# Patient Record
Sex: MALE | Race: WHITE
[De-identification: names, ages, dates, MRNs, and addresses within clinical notes are randomized per-mention and may not be internally consistent; named-entity substitution may affect disease eponyms.]

---

## 2018-07-06 ENCOUNTER — HOSPITAL ENCOUNTER (EMERGENCY)
Dept: HOSPITAL 94 - ER | Age: 63
Discharge: HOME | End: 2018-07-06
Payer: COMMERCIAL

## 2018-07-06 VITALS — HEIGHT: 71 IN | WEIGHT: 240.3 LBS | BODY MASS INDEX: 33.64 KG/M2

## 2018-07-06 VITALS — SYSTOLIC BLOOD PRESSURE: 112 MMHG | DIASTOLIC BLOOD PRESSURE: 73 MMHG

## 2018-07-06 DIAGNOSIS — L03.115: Primary | ICD-10-CM

## 2018-07-06 DIAGNOSIS — Z79.899: ICD-10-CM

## 2018-07-06 DIAGNOSIS — Z88.2: ICD-10-CM

## 2018-07-06 LAB
ALBUMIN SERPL BCP-MCNC: 2.9 G/DL (ref 3.4–5)
ALBUMIN/GLOB SERPL: 0.7 {RATIO} (ref 1.1–1.5)
ALP SERPL-CCNC: 120 IU/L (ref 46–116)
ALT SERPL W P-5'-P-CCNC: 22 U/L (ref 12–78)
ANION GAP SERPL CALCULATED.3IONS-SCNC: 9 MMOL/L (ref 8–16)
APTT PPP: 29 SECONDS (ref 22–32)
AST SERPL W P-5'-P-CCNC: 14 U/L (ref 10–37)
BACTERIA URNS QL MICRO: (no result) /HPF
BASOPHILS # BLD AUTO: 0 X10'3 (ref 0–0.2)
BASOPHILS NFR BLD AUTO: 0.2 % (ref 0–1)
BILIRUB SERPL-MCNC: 0.5 MG/DL (ref 0.1–1)
BUN SERPL-MCNC: 11 MG/DL (ref 7–18)
BUN/CREAT SERPL: 10.7 (ref 5.4–32)
CALCIUM SERPL-MCNC: 8.6 MG/DL (ref 8.5–10.1)
CHLORIDE SERPL-SCNC: 97 MMOL/L (ref 99–107)
CLARITY UR: CLEAR
CO2 SERPL-SCNC: 24.2 MMOL/L (ref 24–32)
COLOR UR: YELLOW
CREAT SERPL-MCNC: 1.03 MG/DL (ref 0.6–1.1)
DEPRECATED SQUAMOUS URNS QL MICRO: (no result) /LPF
EOSINOPHIL # BLD AUTO: 0.1 X10'3 (ref 0–0.9)
EOSINOPHIL NFR BLD AUTO: 1.2 % (ref 0–6)
ERYTHROCYTE [DISTWIDTH] IN BLOOD BY AUTOMATED COUNT: 14.1 % (ref 11.5–14.5)
GFR SERPL CREATININE-BSD FRML MDRD: 73 ML/MIN
GLUCOSE SERPL-MCNC: 326 MG/DL (ref 70–104)
GLUCOSE UR STRIP-MCNC: >=1000 MG/DL
HCT VFR BLD AUTO: 40.4 % (ref 42–52)
HGB BLD-MCNC: 13.9 G/DL (ref 14–17.9)
HGB UR QL STRIP: NEGATIVE
INR PPP: 0.9 INR
KETONES UR STRIP-MCNC: 15 MG/DL
LEUKOCYTE ESTERASE UR QL STRIP: NEGATIVE
LYMPHOCYTES # BLD AUTO: 0.6 X10'3 (ref 1.1–4.8)
LYMPHOCYTES NFR BLD AUTO: 10.9 % (ref 21–51)
MAGNESIUM SERPL-MCNC: 1.6 MG/DL (ref 1.5–2.4)
MCH RBC QN AUTO: 31.3 PG (ref 27–31)
MCHC RBC AUTO-ENTMCNC: 34.4 % (ref 33–36.5)
MCV RBC AUTO: 91.2 FL (ref 78–98)
MONOCYTES # BLD AUTO: 0.6 X10'3 (ref 0–0.9)
MONOCYTES NFR BLD AUTO: 10.9 % (ref 2–12)
NEUTROPHILS # BLD AUTO: 4.5 X10'3 (ref 1.8–7.7)
NEUTROPHILS NFR BLD AUTO: 76.8 % (ref 42–75)
NITRITE UR QL STRIP: NEGATIVE
PH UR STRIP: 6 [PH] (ref 4.8–8)
PLATELET # BLD AUTO: 155 X10'3 (ref 140–440)
PMV BLD AUTO: 9.1 FL (ref 7.4–10.4)
POTASSIUM SERPL-SCNC: 4.1 MMOL/L (ref 3.5–5.1)
PROT SERPL-MCNC: 7.3 G/DL (ref 6.4–8.2)
PROT UR QL STRIP: NEGATIVE MG/DL
PROTHROMBIN TIME: 9.7 SECONDS (ref 9–12)
RBC # BLD AUTO: 4.43 X10'6 (ref 4.7–6.1)
RBC #/AREA URNS HPF: (no result) /HPF (ref 0–2)
SODIUM SERPL-SCNC: 130 MMOL/L (ref 135–145)
SP GR UR STRIP: 1.02 (ref 1–1.03)
URN COLLECT METHOD CLASS: (no result)
UROBILINOGEN UR STRIP-MCNC: 1 E.U/DL (ref 0.2–1)
WBC # BLD AUTO: 5.9 X10'3 (ref 4.5–11)
WBC #/AREA URNS HPF: (no result) /HPF (ref 0–4)

## 2018-07-06 PROCEDURE — 96365 THER/PROPH/DIAG IV INF INIT: CPT

## 2018-07-06 PROCEDURE — 85610 PROTHROMBIN TIME: CPT

## 2018-07-06 PROCEDURE — 84145 PROCALCITONIN (PCT): CPT

## 2018-07-06 PROCEDURE — 93971 EXTREMITY STUDY: CPT

## 2018-07-06 PROCEDURE — 85025 COMPLETE CBC W/AUTO DIFF WBC: CPT

## 2018-07-06 PROCEDURE — 36415 COLL VENOUS BLD VENIPUNCTURE: CPT

## 2018-07-06 PROCEDURE — 83735 ASSAY OF MAGNESIUM: CPT

## 2018-07-06 PROCEDURE — 87040 BLOOD CULTURE FOR BACTERIA: CPT

## 2018-07-06 PROCEDURE — 99285 EMERGENCY DEPT VISIT HI MDM: CPT

## 2018-07-06 PROCEDURE — 80053 COMPREHEN METABOLIC PANEL: CPT

## 2018-07-06 PROCEDURE — 83605 ASSAY OF LACTIC ACID: CPT

## 2018-07-06 PROCEDURE — 81001 URINALYSIS AUTO W/SCOPE: CPT

## 2018-07-06 PROCEDURE — 85730 THROMBOPLASTIN TIME PARTIAL: CPT

## 2018-07-09 ENCOUNTER — APPOINTMENT (OUTPATIENT)
Dept: RADIOLOGY | Facility: MEDICAL CENTER | Age: 63
DRG: 464 | End: 2018-07-09
Attending: ORTHOPAEDIC SURGERY
Payer: COMMERCIAL

## 2018-07-09 ENCOUNTER — HOSPITAL ENCOUNTER (INPATIENT)
Facility: MEDICAL CENTER | Age: 63
LOS: 8 days | DRG: 464 | End: 2018-07-17
Admitting: HOSPITALIST
Payer: COMMERCIAL

## 2018-07-09 ENCOUNTER — HOSPITAL ENCOUNTER (OUTPATIENT)
Facility: MEDICAL CENTER | Age: 63
DRG: 464 | End: 2018-07-09
Payer: COMMERCIAL

## 2018-07-09 DIAGNOSIS — M70.41 PREPATELLAR BURSITIS OF RIGHT KNEE: ICD-10-CM

## 2018-07-09 PROBLEM — M00.9 INFECTION OF RIGHT KNEE (HCC): Status: ACTIVE | Noted: 2018-07-09

## 2018-07-09 PROBLEM — R73.9 HYPERGLYCEMIA: Status: ACTIVE | Noted: 2018-07-09

## 2018-07-09 LAB
CRP SERPL HS-MCNC: 11.93 MG/DL (ref 0–0.75)
ERYTHROCYTE [SEDIMENTATION RATE] IN BLOOD BY WESTERGREN METHOD: 97 MM/HOUR (ref 0–20)
EST. AVERAGE GLUCOSE BLD GHB EST-MCNC: 295 MG/DL
GLUCOSE BLD-MCNC: 176 MG/DL (ref 65–99)
GLUCOSE BLD-MCNC: 196 MG/DL (ref 65–99)
GRAM STN SPEC: NORMAL
HBA1C MFR BLD: 11.9 % (ref 0–5.6)
INR PPP: 1.06 (ref 0.87–1.13)
PROTHROMBIN TIME: 13.5 SEC (ref 12–14.6)
SIGNIFICANT IND 70042: NORMAL
SITE SITE: NORMAL
SOURCE SOURCE: NORMAL

## 2018-07-09 PROCEDURE — 87206 SMEAR FLUORESCENT/ACID STAI: CPT

## 2018-07-09 PROCEDURE — 73560 X-RAY EXAM OF KNEE 1 OR 2: CPT | Mod: RT

## 2018-07-09 PROCEDURE — 85652 RBC SED RATE AUTOMATED: CPT

## 2018-07-09 PROCEDURE — 501488 HCHG SUCTION CANN, WOUNDVAC TRAC: Performed by: ORTHOPAEDIC SURGERY

## 2018-07-09 PROCEDURE — 87186 SC STD MICRODIL/AGAR DIL: CPT

## 2018-07-09 PROCEDURE — 86140 C-REACTIVE PROTEIN: CPT

## 2018-07-09 PROCEDURE — 700101 HCHG RX REV CODE 250

## 2018-07-09 PROCEDURE — 160002 HCHG RECOVERY MINUTES (STAT): Performed by: ORTHOPAEDIC SURGERY

## 2018-07-09 PROCEDURE — 160009 HCHG ANES TIME/MIN: Performed by: ORTHOPAEDIC SURGERY

## 2018-07-09 PROCEDURE — 99223 1ST HOSP IP/OBS HIGH 75: CPT | Performed by: HOSPITALIST

## 2018-07-09 PROCEDURE — 36415 COLL VENOUS BLD VENIPUNCTURE: CPT

## 2018-07-09 PROCEDURE — 500881 HCHG PACK, EXTREMITY: Performed by: ORTHOPAEDIC SURGERY

## 2018-07-09 PROCEDURE — 160035 HCHG PACU - 1ST 60 MINS PHASE I: Performed by: ORTHOPAEDIC SURGERY

## 2018-07-09 PROCEDURE — 770006 HCHG ROOM/CARE - MED/SURG/GYN SEMI*

## 2018-07-09 PROCEDURE — 700111 HCHG RX REV CODE 636 W/ 250 OVERRIDE (IP)

## 2018-07-09 PROCEDURE — 160048 HCHG OR STATISTICAL LEVEL 1-5: Performed by: ORTHOPAEDIC SURGERY

## 2018-07-09 PROCEDURE — 700105 HCHG RX REV CODE 258: Performed by: HOSPITALIST

## 2018-07-09 PROCEDURE — 160041 HCHG SURGERY MINUTES - EA ADDL 1 MIN LEVEL 4: Performed by: ORTHOPAEDIC SURGERY

## 2018-07-09 PROCEDURE — 0MJY3ZZ INSPECTION OF LOWER BURSA AND LIGAMENT, PERCUTANEOUS APPROACH: ICD-10-PCS | Performed by: ORTHOPAEDIC SURGERY

## 2018-07-09 PROCEDURE — 160029 HCHG SURGERY MINUTES - 1ST 30 MINS LEVEL 4: Performed by: ORTHOPAEDIC SURGERY

## 2018-07-09 PROCEDURE — 87116 MYCOBACTERIA CULTURE: CPT

## 2018-07-09 PROCEDURE — 700102 HCHG RX REV CODE 250 W/ 637 OVERRIDE(OP): Performed by: HOSPITALIST

## 2018-07-09 PROCEDURE — 87075 CULTR BACTERIA EXCEPT BLOOD: CPT

## 2018-07-09 PROCEDURE — 87205 SMEAR GRAM STAIN: CPT

## 2018-07-09 PROCEDURE — 83036 HEMOGLOBIN GLYCOSYLATED A1C: CPT

## 2018-07-09 PROCEDURE — 87015 SPECIMEN INFECT AGNT CONCNTJ: CPT | Mod: 91

## 2018-07-09 PROCEDURE — 87070 CULTURE OTHR SPECIMN AEROBIC: CPT

## 2018-07-09 PROCEDURE — 87077 CULTURE AEROBIC IDENTIFY: CPT | Mod: 91

## 2018-07-09 PROCEDURE — 501838 HCHG SUTURE GENERAL: Performed by: ORTHOPAEDIC SURGERY

## 2018-07-09 PROCEDURE — 700111 HCHG RX REV CODE 636 W/ 250 OVERRIDE (IP): Performed by: HOSPITALIST

## 2018-07-09 PROCEDURE — A6550 NEG PRES WOUND THER DRSG SET: HCPCS | Performed by: ORTHOPAEDIC SURGERY

## 2018-07-09 PROCEDURE — 0HBKXZZ EXCISION OF RIGHT LOWER LEG SKIN, EXTERNAL APPROACH: ICD-10-PCS | Performed by: ORTHOPAEDIC SURGERY

## 2018-07-09 PROCEDURE — 501445 HCHG STAPLER, SKIN DISP: Performed by: ORTHOPAEDIC SURGERY

## 2018-07-09 PROCEDURE — 87102 FUNGUS ISOLATION CULTURE: CPT

## 2018-07-09 PROCEDURE — A6454 SELF-ADHER BAND W>=3" <5"/YD: HCPCS | Performed by: ORTHOPAEDIC SURGERY

## 2018-07-09 PROCEDURE — 85610 PROTHROMBIN TIME: CPT

## 2018-07-09 PROCEDURE — 82962 GLUCOSE BLOOD TEST: CPT | Mod: 91

## 2018-07-09 RX ORDER — OXYCODONE HYDROCHLORIDE 5 MG/1
5-10 TABLET ORAL
Status: DISCONTINUED | OUTPATIENT
Start: 2018-07-09 | End: 2018-07-17 | Stop reason: HOSPADM

## 2018-07-09 RX ORDER — PROMETHAZINE HYDROCHLORIDE 25 MG/1
12.5-25 TABLET ORAL EVERY 4 HOURS PRN
Status: DISCONTINUED | OUTPATIENT
Start: 2018-07-09 | End: 2018-07-17 | Stop reason: HOSPADM

## 2018-07-09 RX ORDER — BISACODYL 10 MG
10 SUPPOSITORY, RECTAL RECTAL
Status: DISCONTINUED | OUTPATIENT
Start: 2018-07-09 | End: 2018-07-17 | Stop reason: HOSPADM

## 2018-07-09 RX ORDER — MAGNESIUM HYDROXIDE 1200 MG/15ML
LIQUID ORAL
Status: COMPLETED | OUTPATIENT
Start: 2018-07-09 | End: 2018-07-09

## 2018-07-09 RX ORDER — ONDANSETRON 2 MG/ML
4 INJECTION INTRAMUSCULAR; INTRAVENOUS EVERY 4 HOURS PRN
Status: DISCONTINUED | OUTPATIENT
Start: 2018-07-09 | End: 2018-07-17 | Stop reason: HOSPADM

## 2018-07-09 RX ORDER — AMOXICILLIN 250 MG
2 CAPSULE ORAL 2 TIMES DAILY
Status: DISCONTINUED | OUTPATIENT
Start: 2018-07-09 | End: 2018-07-17 | Stop reason: HOSPADM

## 2018-07-09 RX ORDER — ACETAMINOPHEN 325 MG/1
650 TABLET ORAL EVERY 6 HOURS PRN
Status: DISCONTINUED | OUTPATIENT
Start: 2018-07-09 | End: 2018-07-17 | Stop reason: HOSPADM

## 2018-07-09 RX ORDER — DEXTROSE MONOHYDRATE 25 G/50ML
25 INJECTION, SOLUTION INTRAVENOUS
Status: DISCONTINUED | OUTPATIENT
Start: 2018-07-09 | End: 2018-07-17 | Stop reason: HOSPADM

## 2018-07-09 RX ORDER — MORPHINE SULFATE 4 MG/ML
1-4 INJECTION, SOLUTION INTRAMUSCULAR; INTRAVENOUS
Status: DISCONTINUED | OUTPATIENT
Start: 2018-07-09 | End: 2018-07-17 | Stop reason: HOSPADM

## 2018-07-09 RX ORDER — POLYETHYLENE GLYCOL 3350 17 G/17G
1 POWDER, FOR SOLUTION ORAL
Status: DISCONTINUED | OUTPATIENT
Start: 2018-07-09 | End: 2018-07-17 | Stop reason: HOSPADM

## 2018-07-09 RX ORDER — ONDANSETRON 4 MG/1
4 TABLET, ORALLY DISINTEGRATING ORAL EVERY 4 HOURS PRN
Status: DISCONTINUED | OUTPATIENT
Start: 2018-07-09 | End: 2018-07-17 | Stop reason: HOSPADM

## 2018-07-09 RX ORDER — PROMETHAZINE HYDROCHLORIDE 12.5 MG/1
12.5-25 SUPPOSITORY RECTAL EVERY 4 HOURS PRN
Status: DISCONTINUED | OUTPATIENT
Start: 2018-07-09 | End: 2018-07-17 | Stop reason: HOSPADM

## 2018-07-09 RX ADMIN — FENTANYL CITRATE 50 MCG: 50 INJECTION, SOLUTION INTRAMUSCULAR; INTRAVENOUS at 20:38

## 2018-07-09 RX ADMIN — INSULIN HUMAN 3 UNITS: 100 INJECTION, SOLUTION PARENTERAL at 17:54

## 2018-07-09 RX ADMIN — AMPICILLIN SODIUM AND SULBACTAM SODIUM 3 G: 2; 1 INJECTION, POWDER, FOR SOLUTION INTRAMUSCULAR; INTRAVENOUS at 23:41

## 2018-07-09 RX ADMIN — INSULIN HUMAN 3 UNITS: 100 INJECTION, SOLUTION PARENTERAL at 22:25

## 2018-07-09 RX ADMIN — FENTANYL CITRATE 50 MCG: 50 INJECTION, SOLUTION INTRAMUSCULAR; INTRAVENOUS at 20:43

## 2018-07-09 RX ADMIN — AMPICILLIN SODIUM AND SULBACTAM SODIUM 3 G: 2; 1 INJECTION, POWDER, FOR SOLUTION INTRAMUSCULAR; INTRAVENOUS at 16:55

## 2018-07-09 ASSESSMENT — COGNITIVE AND FUNCTIONAL STATUS - GENERAL
MOBILITY SCORE: 19
MOVING FROM LYING ON BACK TO SITTING ON SIDE OF FLAT BED: A LITTLE
CLIMB 3 TO 5 STEPS WITH RAILING: A LITTLE
SUGGESTED CMS G CODE MODIFIER DAILY ACTIVITY: CI
TURNING FROM BACK TO SIDE WHILE IN FLAT BAD: A LITTLE
STANDING UP FROM CHAIR USING ARMS: A LITTLE
SUGGESTED CMS G CODE MODIFIER MOBILITY: CK
WALKING IN HOSPITAL ROOM: A LITTLE
DAILY ACTIVITIY SCORE: 23
DRESSING REGULAR LOWER BODY CLOTHING: A LITTLE

## 2018-07-09 ASSESSMENT — PAIN SCALES - GENERAL
PAINLEVEL_OUTOF10: 4
PAINLEVEL_OUTOF10: 0
PAINLEVEL_OUTOF10: 4
PAINLEVEL_OUTOF10: 0
PAINLEVEL_OUTOF10: 3

## 2018-07-09 ASSESSMENT — PATIENT HEALTH QUESTIONNAIRE - PHQ9
1. LITTLE INTEREST OR PLEASURE IN DOING THINGS: NOT AT ALL
2. FEELING DOWN, DEPRESSED, IRRITABLE, OR HOPELESS: NOT AT ALL
SUM OF ALL RESPONSES TO PHQ9 QUESTIONS 1 AND 2: 0

## 2018-07-09 ASSESSMENT — ENCOUNTER SYMPTOMS
VOMITING: 0
FEVER: 0
SHORTNESS OF BREATH: 0
CHILLS: 0

## 2018-07-09 ASSESSMENT — LIFESTYLE VARIABLES
ALCOHOL_USE: NO
EVER_SMOKED: NEVER

## 2018-07-09 NOTE — CONSULTS
Date of Service:  7/9/2018    PCP: Pcp Pt States None    CC:  Right TKA infection    HPI: This is a 62 y.o. male who is s/p right total knee arthroplasty with Dr Marin in 2008.  He has a history of L TKA revision for infection with Dr Ko in 2013.  He presents with an approximate 10 day history of worsening right knee and leg erythema, swelling, and draining pus from a wound on the anterior aspect of the knee.  He was treated with a course of oral clindamycin.  He presented to [facility] today with worsening symptoms.  He was transferred to [facility] for definitive management.  Orthopaedics was asked to consult by Dr Robbins.      ROS: As above. The remainder of a complete review of systems is negative in all systems except as noted.    PMHx:  Active Ambulatory Problems     Diagnosis Date Noted   • No Active Ambulatory Problems     Resolved Ambulatory Problems     Diagnosis Date Noted   • No Resolved Ambulatory Problems     No Additional Past Medical History       SHx:  Social History     Social History   • Marital status:      Spouse name: N/A   • Number of children: N/A   • Years of education: N/A     Occupational History   • Not on file.     Social History Main Topics   • Smoking status: Not on file   • Smokeless tobacco: Not on file   • Alcohol use Not on file   • Drug use: Unknown   • Sexual activity: Not on file     Other Topics Concern   • Not on file     Social History Narrative   • No narrative on file       FHx:  family history is not on file.    Allergies:  Allergies not on file    Medications:  No current facility-administered medications on file prior to encounter.      No current outpatient prescriptions on file prior to encounter.       Objective Exam:  Vitals:    07/09/18 1400   BP: 135/81   Pulse: 84   Resp: 16   Temp: 36.9 °C (98.5 °F)   SpO2: 98%   Weight: 114 kg (251 lb 5.2 oz)       General: alert & oriented  HEENT: head atraumatic, neck supple, mucus membranes moist  Chest:  nonlabored breathing, no audible wheezing  CV: regular rate, rhythm  Abd: soft, nontender  Ext: right lower extremity - erythema and swelling over the anterior aspect of knee tracking down the anterior aspect of the leg.  Necrotic eschar present over the anterior knee draining pus.  Knee ROM is limited 0-15 secondary to pain and swelling.  He is able to flex and extend his ankle, toes.  Foot is warm, well-perfused.  Neuro: sensation preserved over right foot  Skin: as above    Laboratory--reviewed personally and are as follows:  Lab Results   Component Value Date/Time    WBC 4.9 03/05/2007 10:05 AM    RBC 5.11 03/05/2007 10:05 AM    HEMOGLOBIN 15.3 03/05/2007 10:05 AM    HEMATOCRIT 43.7 03/05/2007 10:05 AM    MCV 85.4 03/05/2007 10:05 AM    MCH 29.9 03/05/2007 10:05 AM    MCHC 35.0 03/05/2007 10:05 AM    MPV 7.5 03/05/2007 10:05 AM    NEUTSPOLYS 58.9 03/05/2007 10:05 AM    LYMPHOCYTES 28.6 03/05/2007 10:05 AM    MONOCYTES 5.5 03/05/2007 10:05 AM    EOSINOPHILS 6.7 (H) 03/05/2007 10:05 AM    BASOPHILS 0.4 03/05/2007 10:05 AM      No results found for: SODIUM, POTASSIUM, CHLORIDE, CO2, GLUCOSE, BUN, CREATININE, BUNCREATRAT, GLOMRATE   No results found for: PROTHROMBTM, INR     Radiology  2 views R knee pending    Assessment:  - Right knee infected bursitis with necrotic eschar in setting of total knee arthroplasty    Plan:  - Please keep NPO.  Will plan for OR today for I&D of the right knee & possible wound vac placement.  Will aspirate knee through non-cellulitic area at that time and send for cell count with differential and culture data.  There is a possibility of full poly exchange depending on the status of the fascial layer.  Please hold antibiotics at this time.  Ordered for knee x-rays, CRP, ESR.  Discussed with patient who agrees to proceed  - WBAT RLE

## 2018-07-09 NOTE — CARE PLAN
Problem: Safety  Goal: Will remain free from injury  Outcome: PROGRESSING AS EXPECTED  Pt educated on fall risk. Call light is within reach. Bed alarm in use.     Problem: Infection  Goal: Will remain free from infection  Outcome: PROGRESSING AS EXPECTED  RN educated pt on not touching open wound. Pt verbalized understanding.

## 2018-07-09 NOTE — ASSESSMENT & PLAN NOTE
s/p I/D 7/9 and 7/12  Ortho on  Culture pending  ID on, orderPICC line.  Patient initially declined however after prolonged discussion patient agreed currently.  Close monitor  Cont iv abx cefazolin total of 4 weeks antibiotics  Patient has infected bursitis  PICC line in place  Patient need to have IV antibiotics, likely be able to use ertapenem however patient wants to go home at this moment we will try to convince patient to accept IV antibiotics at home.  Patient also need wound care as outpatient.

## 2018-07-09 NOTE — PROGRESS NOTES
Direct admit from [facility]. Accepted by Dr. Robbins for rt knee cellulitis.  ADT signed & held, needs to be released upon pt arrival.  No written orders received.  Pt coming by ground.

## 2018-07-09 NOTE — PROGRESS NOTES
Pharmacy Kinetics 62 y.o. male on vancomycin day # 1    2018    Currently on Vancomycin 2900 mg IV x1 followed by 1700 mg IV q12h (patient did get 1000 mg IV at about 0900 this AM)    Indication for Treatment: R septic knee    Pertinent history per medical record: Admitted on 2018 for R knee infection. Patient presented to [facility] for right knee infection and started on keflex/clinda without improvement. He has gotten progressively worse with purulent material draining from knee. Ortho has been consulted and is taking patient for aspiration, I&D, ? Wound vac placement tonight. Had knee arthroplasty in ..    Other antibiotics: Unasyn 3 g IV q6h    Allergies: Patient has no allergy information on record.     List concerns for renal function: BMI, age    Pertinent cultures to date:   Yet to be drawn    Labs from [facility] this morning with BUN 16 and SCr 0.8    Blood pressure 135/81, pulse 84, temperature 36.9 °C (98.5 °F), resp. rate 16, weight 114 kg (251 lb 5.2 oz), SpO2 98 %. Temp (24hrs), Av.9 °C (98.5 °F), Min:36.9 °C (98.5 °F), Max:36.9 °C (98.5 °F)      A/P   1. Vancomycin dose change: new start  2. Next vancomycin level: 12- days (not ordered)  3. Goal trough: ~16 mcg/mL (? Septic joint, but no notes yet)  4. Comments: D/w MD, ortho to take to OR tonight. Ortho would like all abx held until after procedure. Unasyn was given already and vanco this morning. I have removed the vanco dose from the MAR for now and will restart post-op. Cx to be sent intra-op.    Daryl Michel, PharmD, BCPS

## 2018-07-10 PROBLEM — E11.9 TYPE 2 DIABETES MELLITUS, WITHOUT LONG-TERM CURRENT USE OF INSULIN (HCC): Status: ACTIVE | Noted: 2018-07-10

## 2018-07-10 PROBLEM — E87.1 HYPONATREMIA: Status: ACTIVE | Noted: 2018-07-10

## 2018-07-10 LAB
ALBUMIN SERPL BCP-MCNC: 3.1 G/DL (ref 3.2–4.9)
ALBUMIN/GLOB SERPL: 0.8 G/DL
ALP SERPL-CCNC: 63 U/L (ref 30–99)
ALT SERPL-CCNC: 16 U/L (ref 2–50)
ANION GAP SERPL CALC-SCNC: 9 MMOL/L (ref 0–11.9)
AST SERPL-CCNC: 12 U/L (ref 12–45)
BASOPHILS # BLD AUTO: 0.2 % (ref 0–1.8)
BASOPHILS # BLD: 0.01 K/UL (ref 0–0.12)
BILIRUB SERPL-MCNC: 0.3 MG/DL (ref 0.1–1.5)
BUN SERPL-MCNC: 20 MG/DL (ref 8–22)
CALCIUM SERPL-MCNC: 8.6 MG/DL (ref 8.5–10.5)
CHLORIDE SERPL-SCNC: 101 MMOL/L (ref 96–112)
CO2 SERPL-SCNC: 22 MMOL/L (ref 20–33)
CREAT SERPL-MCNC: 0.85 MG/DL (ref 0.5–1.4)
EOSINOPHIL # BLD AUTO: 0.01 K/UL (ref 0–0.51)
EOSINOPHIL NFR BLD: 0.2 % (ref 0–6.9)
ERYTHROCYTE [DISTWIDTH] IN BLOOD BY AUTOMATED COUNT: 44.4 FL (ref 35.9–50)
GLOBULIN SER CALC-MCNC: 3.7 G/DL (ref 1.9–3.5)
GLUCOSE BLD-MCNC: 206 MG/DL (ref 65–99)
GLUCOSE BLD-MCNC: 215 MG/DL (ref 65–99)
GLUCOSE BLD-MCNC: 240 MG/DL (ref 65–99)
GLUCOSE BLD-MCNC: 245 MG/DL (ref 65–99)
GLUCOSE SERPL-MCNC: 249 MG/DL (ref 65–99)
GRAM STN SPEC: NORMAL
HCT VFR BLD AUTO: 41.9 % (ref 42–52)
HGB BLD-MCNC: 13.6 G/DL (ref 14–18)
IMM GRANULOCYTES # BLD AUTO: 0.07 K/UL (ref 0–0.11)
IMM GRANULOCYTES NFR BLD AUTO: 1.3 % (ref 0–0.9)
LYMPHOCYTES # BLD AUTO: 0.63 K/UL (ref 1–4.8)
LYMPHOCYTES NFR BLD: 11.7 % (ref 22–41)
MCH RBC QN AUTO: 29.9 PG (ref 27–33)
MCHC RBC AUTO-ENTMCNC: 32.5 G/DL (ref 33.7–35.3)
MCV RBC AUTO: 92.1 FL (ref 81.4–97.8)
MONOCYTES # BLD AUTO: 0.12 K/UL (ref 0–0.85)
MONOCYTES NFR BLD AUTO: 2.2 % (ref 0–13.4)
NEUTROPHILS # BLD AUTO: 4.55 K/UL (ref 1.82–7.42)
NEUTROPHILS NFR BLD: 84.4 % (ref 44–72)
NRBC # BLD AUTO: 0 K/UL
NRBC BLD-RTO: 0 /100 WBC
PLATELET # BLD AUTO: 220 K/UL (ref 164–446)
PMV BLD AUTO: 10.3 FL (ref 9–12.9)
POTASSIUM SERPL-SCNC: 4.7 MMOL/L (ref 3.6–5.5)
PROT SERPL-MCNC: 6.8 G/DL (ref 6–8.2)
RBC # BLD AUTO: 4.55 M/UL (ref 4.7–6.1)
RHODAMINE-AURAMINE STN SPEC: NORMAL
SIGNIFICANT IND 70042: NORMAL
SIGNIFICANT IND 70042: NORMAL
SITE SITE: NORMAL
SITE SITE: NORMAL
SODIUM SERPL-SCNC: 132 MMOL/L (ref 135–145)
SOURCE SOURCE: NORMAL
SOURCE SOURCE: NORMAL
VANCOMYCIN TROUGH SERPL-MCNC: 43.6 UG/ML (ref 10–20)
WBC # BLD AUTO: 5.4 K/UL (ref 4.8–10.8)

## 2018-07-10 PROCEDURE — 93005 ELECTROCARDIOGRAM TRACING: CPT | Performed by: INTERNAL MEDICINE

## 2018-07-10 PROCEDURE — 80202 ASSAY OF VANCOMYCIN: CPT

## 2018-07-10 PROCEDURE — 85025 COMPLETE CBC W/AUTO DIFF WBC: CPT

## 2018-07-10 PROCEDURE — 99233 SBSQ HOSP IP/OBS HIGH 50: CPT | Performed by: INTERNAL MEDICINE

## 2018-07-10 PROCEDURE — 82962 GLUCOSE BLOOD TEST: CPT | Mod: 91

## 2018-07-10 PROCEDURE — 99223 1ST HOSP IP/OBS HIGH 75: CPT | Performed by: INTERNAL MEDICINE

## 2018-07-10 PROCEDURE — 700102 HCHG RX REV CODE 250 W/ 637 OVERRIDE(OP): Performed by: INTERNAL MEDICINE

## 2018-07-10 PROCEDURE — 700111 HCHG RX REV CODE 636 W/ 250 OVERRIDE (IP): Performed by: INTERNAL MEDICINE

## 2018-07-10 PROCEDURE — 93010 ELECTROCARDIOGRAM REPORT: CPT | Performed by: INTERNAL MEDICINE

## 2018-07-10 PROCEDURE — 770006 HCHG ROOM/CARE - MED/SURG/GYN SEMI*

## 2018-07-10 PROCEDURE — 700105 HCHG RX REV CODE 258: Performed by: HOSPITALIST

## 2018-07-10 PROCEDURE — 80053 COMPREHEN METABOLIC PANEL: CPT

## 2018-07-10 PROCEDURE — 700111 HCHG RX REV CODE 636 W/ 250 OVERRIDE (IP): Performed by: HOSPITALIST

## 2018-07-10 PROCEDURE — 36415 COLL VENOUS BLD VENIPUNCTURE: CPT

## 2018-07-10 RX ORDER — INSULIN GLARGINE 100 [IU]/ML
10 INJECTION, SOLUTION SUBCUTANEOUS EVERY EVENING
Status: DISCONTINUED | OUTPATIENT
Start: 2018-07-10 | End: 2018-07-11

## 2018-07-10 RX ADMIN — INSULIN HUMAN 4 UNITS: 100 INJECTION, SOLUTION PARENTERAL at 17:34

## 2018-07-10 RX ADMIN — INSULIN HUMAN 4 UNITS: 100 INJECTION, SOLUTION PARENTERAL at 21:34

## 2018-07-10 RX ADMIN — INSULIN GLARGINE 10 UNITS: 100 INJECTION, SOLUTION SUBCUTANEOUS at 17:32

## 2018-07-10 RX ADMIN — INSULIN HUMAN 4 UNITS: 100 INJECTION, SOLUTION PARENTERAL at 09:14

## 2018-07-10 RX ADMIN — ENOXAPARIN SODIUM 40 MG: 100 INJECTION SUBCUTANEOUS at 14:22

## 2018-07-10 RX ADMIN — INSULIN HUMAN 4 UNITS: 100 INJECTION, SOLUTION PARENTERAL at 12:10

## 2018-07-10 RX ADMIN — AMPICILLIN SODIUM AND SULBACTAM SODIUM 3 G: 2; 1 INJECTION, POWDER, FOR SOLUTION INTRAMUSCULAR; INTRAVENOUS at 05:43

## 2018-07-10 RX ADMIN — VANCOMYCIN HYDROCHLORIDE 1700 MG: 100 INJECTION, POWDER, LYOPHILIZED, FOR SOLUTION INTRAVENOUS at 13:03

## 2018-07-10 RX ADMIN — VANCOMYCIN HYDROCHLORIDE 2900 MG: 100 INJECTION, POWDER, LYOPHILIZED, FOR SOLUTION INTRAVENOUS at 00:14

## 2018-07-10 RX ADMIN — AMPICILLIN SODIUM AND SULBACTAM SODIUM 3 G: 2; 1 INJECTION, POWDER, FOR SOLUTION INTRAMUSCULAR; INTRAVENOUS at 17:29

## 2018-07-10 RX ADMIN — AMPICILLIN SODIUM AND SULBACTAM SODIUM 3 G: 2; 1 INJECTION, POWDER, FOR SOLUTION INTRAMUSCULAR; INTRAVENOUS at 12:07

## 2018-07-10 ASSESSMENT — ENCOUNTER SYMPTOMS
FALLS: 0
EYE PAIN: 0
HEARTBURN: 0
NAUSEA: 0
PND: 0
SPUTUM PRODUCTION: 0
WEAKNESS: 0
WHEEZING: 0
SPEECH CHANGE: 0
COUGH: 0
HEADACHES: 0
SEIZURES: 0
PALPITATIONS: 0
DEPRESSION: 0
BLURRED VISION: 0
SHORTNESS OF BREATH: 0
CONSTIPATION: 0
WEIGHT LOSS: 0
ABDOMINAL PAIN: 0
VOMITING: 0
NECK PAIN: 0
DIZZINESS: 0
TREMORS: 0
FEVER: 0
CHILLS: 0
BACK PAIN: 0

## 2018-07-10 ASSESSMENT — PAIN SCALES - GENERAL
PAINLEVEL_OUTOF10: 0
PAINLEVEL_OUTOF10: 0

## 2018-07-10 ASSESSMENT — LIFESTYLE VARIABLES: SUBSTANCE_ABUSE: 0

## 2018-07-10 NOTE — PROGRESS NOTES
Seen & examined  Pain controlled    Vitals:    07/09/18 2100 07/09/18 2128 07/10/18 0330 07/10/18 0749   BP:  117/61 133/70 130/68   Pulse: 74 71 84 90   Resp: 16 18 18 15   Temp:  36.4 °C (97.5 °F) 36.1 °C (97 °F) 36.3 °C (97.3 °F)   SpO2: 96% 98% 94% 96%   Weight:       Height:         RLE:  Wound vac intact to suction  Improved erythema and swelling  f/e ankle, toes  Foot w/w/p    POD#1 s/p R prepatella bursectomy, I&D.  Dry aspiration of joint.    - OR cultures - no growth to date  - WBAT  - Continue wound vac at current settings   - IV antibiotics  - Plan to return to OR on Thursday 7/12 for repeat I&D with closure possible skin grafting

## 2018-07-10 NOTE — DIETARY
Nutrition Services: Pt seen for poor PO intake pta per nutrition admit screen.     Pt is a 61 yo M admitted for cellulitis of right knee and on day 1 of admit on a diabetic diet.    Ht: 182.2 cm  Wt: 114 kg   BMI: 34.34 (obese, class I)    Per ADLs pt consuming % meals on diabetic diet. PO intake appears adequate at this time, please re-consult prn.     RD to monitor per dept per policy

## 2018-07-10 NOTE — OP REPORT
DATE OF SERVICE:  07/09/2018    PREOPERATIVE DIAGNOSIS:  Infected prepatellar bursa.    POSTOPERATIVE DIAGNOSIS:  Infected prepatellar bursa.    OPERATION PERFORMED:  1.  Irrigation and debridement.  2.  Dry aspiration of right knee.  3.  Application of wound VAC.    SURGEON:  Riley Justin MD    ASSISTANT:  Gunnar Sandoval MD    INDICATIONS FOR THE OPERATION:  Infected prepatellar bursa, now being brought   to surgery for irrigation, debridement, possible arthrotomy with poly   exchange.    COMPLICATIONS:  None.    OPERATION IN DETAIL:  The patient was brought to the operating room, awake and   alert, placed on the operating table in supine position, delivered general   endotracheal anesthetic.  Right lower extremity was shaved, scrubbed, prepped   and draped in normal sterile routine fashion.  A full thickness eschar   directly over the patella was excised and then the prepatellar bursal area was   explored.  There was no apparent communication with the knee joint.  We then   aspirated through a noncellulitic area, it was a completely dry aspirate.  We   then flexed and extended the knee and there was no extrusion of any material   suggesting a dry knee joint.  For these reasons, we did not proceed with an   arthrotomy.    We copiously irrigated pulse mechanical irrigation and then application of a   small wound VAC.  We will probably ask plastics for consideration of   rotational flap and coverage.  Patient was taken to recovery in stable   condition.  No intraoperative or immediate postop complications.  Patient   tolerated the procedure well.       ____________________________________     MD XAVIER STERN / NTS    DD:  07/09/2018 20:06:22  DT:  07/09/2018 20:19:14    D#:  [id number]  Job#:  [id number]

## 2018-07-10 NOTE — CONSULTS
ADULT INFECTIOUS DISEASE CONSULT     Date of Service: 7/9/2018    Consult Requested By: Kimberli Holloway M.D.    Reason for Consultation: Right knee infection    History of Present Illness:   Boni Ruiz is a 62 y.o. male with bilateral knee replacements in 2008 and then left knee revision in 2013.  He had kneeled down on some asphalt and subsequently developed a pus pocket which had been draining pus for 2 weeks.  He had initially taken an needle into the blister and popped it.  He says he did it twice.  But it continued to get drainage. He  went to [facility] emergency room and was placed on clindamycin and Keflex.  Despite that he continued to have drainage from the knee and the erythema was down to the shin.  The significant pain that he could not even walk or drive/he presented to the emergency room in [facility].  From there he was transferred to [facility] for higher level of care.  He was also found to have a glucose of 223.  His hemoglobin A1c was 11.9 on admission.  He was taken to the OR on 7/9/2018 and underwent I&D of the prepatellar bursa.  Infectious disease consult has been called for antibiotics.    Review Of Systems:  Gen.-Complains of fevers. Denies any chills.  HEENT- denies any sore throat, headache or vision changes  Pulmonary- denies any cough, shortness of breath  Cardiovascular- denies any chest pain, leg swelling.    GI- denies any nausea vomiting diarrhea or abdominal pain  Musculoskeletal- c/o pain in the right knee.  Patient continued to have by taking  Neuro- denies any weakness or sensory change  Psych- denies any depression or suicidal ideation  Genitourinary- denies any frequency or dysuria        PMH:   No past medical history on file.      PSH:  Past Surgical History:   Procedure Laterality Date   • KNEE ARTHROPLASTY TOTAL  7/9/2018    Procedure: Irrigation and Debridement of Right Patellar Bursa , with wound Vac Application;  Surgeon: Riley  BRENDAN Justin M.D.;  Location: SURGERY [facility];  Service: Orthopedics       FAMILY HX:  No family history on file.    SOCIAL HX:  Social History     Social History   • Marital status:      Spouse name: N/A   • Number of children: N/A   • Years of education: N/A     Occupational History   • Not on file.     Social History Main Topics   • Smoking status: Passive Smoke Exposure - Never Smoker   • Smokeless tobacco: Never Used   • Alcohol use Not on file   • Drug use: Unknown   • Sexual activity: Not on file     Other Topics Concern   • Not on file     Social History Narrative   • No narrative on file     History   Smoking Status   • Passive Smoke Exposure - Never Smoker   Smokeless Tobacco   • Never Used     History   Alcohol use Not on file       Allergies/Intolerances:  No Known Allergies    History reviewed with the patient    Other Current Medications:    Current Facility-Administered Medications:   •  senna-docusate (PERICOLACE or SENOKOT S) 8.6-50 MG per tablet 2 Tab, 2 Tab, Oral, BID **AND** polyethylene glycol/lytes (MIRALAX) PACKET 1 Packet, 1 Packet, Oral, QDAY PRN **AND** magnesium hydroxide (MILK OF MAGNESIA) suspension 30 mL, 30 mL, Oral, QDAY PRN **AND** bisacodyl (DULCOLAX) suppository 10 mg, 10 mg, Rectal, QDAY PRN, Amol Cohen M.D.  •  ondansetron (ZOFRAN) syringe/vial injection 4 mg, 4 mg, Intravenous, Q4HRS PRN, Amol Cohen M.D.  •  ondansetron (ZOFRAN ODT) dispertab 4 mg, 4 mg, Oral, Q4HRS PRN, Amol Cohen M.D.  •  promethazine (PHENERGAN) tablet 12.5-25 mg, 12.5-25 mg, Oral, Q4HRS PRN, Amol Cohen M.D.  •  promethazine (PHENERGAN) suppository 12.5-25 mg, 12.5-25 mg, Rectal, Q4HRS PRN, Amol Cohen M.D.  •  prochlorperazine (COMPAZINE) injection 5-10 mg, 5-10 mg, Intravenous, Q4HRS PRN, Amol M Noel, M.D.  •  insulin regular (HUMULIN R) injection 3-14 Units, 3-14 Units, Subcutaneous, 4X/DAY ACHS, 4 Units at 07/10/18 0914 **AND** Accu-Chek ACHS, , , Q AC AND BEDTIME(S) **AND**  "NOTIFY MD and PharmD, , , Once **AND** glucose 4 g chewable tablet 16 g, 16 g, Oral, Q15 MIN PRN **AND** dextrose 50% (D50W) injection 25 mL, 25 mL, Intravenous, Q15 MIN PRN, Amol Cohen M.D.  •  acetaminophen (TYLENOL) tablet 650 mg, 650 mg, Oral, Q6HRS PRN, Amol Cohen M.D.  •  MD ALERT... vancomycin per pharmacy protocol, , Other, pharmacy to dose, Amol Cohen M.D.  •  ampicillin/sulbactam (UNASYN) 3 g in  mL IVPB, 3 g, Intravenous, Q6HRS, Amol Cohen M.D., Stopped at 07/10/18 0613  •  oxyCODONE immediate-release (ROXICODONE) tablet 5-10 mg, 5-10 mg, Oral, Q3HRS PRN, Amol Cohen M.D.  •  morphine (pf) 4 mg/ml injection 1-4 mg, 1-4 mg, Intravenous, Q2HRS PRN, Amol Cohen M.D.  •  [COMPLETED] vancomycin 2,900 mg in  mL IVPB, 25 mg/kg, Intravenous, Once, Stopped at 07/10/18 0314 **FOLLOWED BY** vancomycin 1,700 mg in  mL IVPB, 15 mg/kg, Intravenous, Q12HR, Amol Cohen M.D.  [unfilled]    Most Recent Vital Signs:  /68   Pulse 90   Temp 36.3 °C (97.3 °F)   Resp 15   Ht 1.822 m (5' 11.73\")   Wt 114 kg (251 lb 5.2 oz)   SpO2 96%   BMI 34.34 kg/m²   Temp  Av.5 °C (97.7 °F)  Min: 36.1 °C (97 °F)  Max: 36.9 °C (98.5 °F)    Physical Exam:  General: Nontoxic, no acute distress looks tired  HEENT: sclera anicteric, PERRL, EOMI, MMM, no oral lesions  Neck: supple, no lymphadenopathy  Chest: CTAB, no r/r/w, normal work of breathing.  Cardiac: Irregular  Abdomen: + bowel sounds, soft, non-tender, non-distended, no HSM  Extremities: Right leg is swollen and surgical bandage present  Skin: no rashes or erythema  Neuro: Alert and oriented times 3, non-focal exam    Pertinent Lab Results:  Recent Labs      07/10/18   0228   WBC  5.4      Recent Labs      07/10/18   0228   HEMOGLOBIN  13.6*   HEMATOCRIT  41.9*   MCV  92.1   MCH  29.9   PLATELETCT  220         Recent Labs      07/10/18   0228   SODIUM  132*   POTASSIUM  4.7   CHLORIDE  101   CO2  22   CREATININE  0.85 "        Recent Labs      07/10/18   0228   ALBUMIN  3.1*        Pertinent Micro:  Results     Procedure Component Value Units Date/Time    GRAM STAIN [[id number]] Collected:  07/09/18 1956    Order Status:  Completed Specimen:  Tissue Updated:  07/10/18 0616     Significant Indicator .     Source TISS     Site Right Knee Tissue     Gram Stain Result Few WBCs.  No organisms seen.      GRAM STAIN [[id number]] Collected:  07/09/18 1550    Order Status:  Completed Specimen:  Wound Updated:  07/09/18 2156     Significant Indicator .     Source WND     Site Right Leg     Gram Stain Result Many WBCs.  Rare Gram positive cocci.      CULTURE TISSUE W/ GRM STAIN [[id number]] Collected:  07/09/18 1956    Order Status:  Completed Specimen:  Other Updated:  07/09/18 2127    ANAEROBIC CULTURE [[id number]] Collected:  07/09/18 1956    Order Status:  Completed Specimen:  Other Updated:  07/09/18 2127    AFB CULTURE [[id number]] Collected:  07/09/18 1956    Order Status:  Completed Specimen:  Other Updated:  07/09/18 2127    FUNGAL CULTURE [[id number]] Collected:  07/09/18 1956    Order Status:  Completed Specimen:  Other Updated:  07/09/18 2127    CULTURE WOUND W/ GRAM STAIN [[id number]] Collected:  07/09/18 1550    Order Status:  Completed Specimen:  Other Updated:  07/09/18 1729    CULTURE WOUND W/O GRAM STAIN [[id number]]     Order Status:  No result Specimen:  Wound from Right Leg         No results found for: BLOODCULTU, BLDCULT, BCHOLD     Studies:  Dx-knee 2- Right    Result Date: 7/9/2018 7/9/2018 4:35 PM HISTORY/REASON FOR EXAM: Anterior knee pain without injury. TECHNIQUE/EXAM DESCRIPTION AND NUMBER OF VIEWS:  2 views of the right knee. COMPARISON: None FINDINGS: Anterior to the patella there is soft tissue swelling and gas. Subjacent edema Total knee arthroplasty with patellar resurfacing has been performed.  The tibial component is cemented. There is also cement anterior to the tibial tubercle/proximal tibia No periprosthetic  fracture is detected. There is question of lucency underlying the medial margin of the tibial component Ossification seen superomedial to the femur is likely post traumatic     Prepatellar soft tissue swelling and gas is compatible with infection and/or laceration. No gross intra-articular extension is seen Total knee arthroplasty. Lucency underlying the medial margin of the tibia could be chronic or secondary to loosening. Comparison with prior x-rays would prove helpful   IMPRESSION:     Prepatellar septic bursitis  Prosthetic knee in place  Newly diagnosed diabetes mellitus      PLAN:   Boni Ruiz is a 62 y.o. male with newly diagnosed diabetes mellitus admitted with prepatellar septic bursitis after kneeling on asphalt.  Patient has underlying prosthetic knee in place.  Worry about that being infected.  The OR cultures are showing group B strep so far.  Patient was on antibiotics previously.  I discussed at length with the patient.  He tells me that in [place] cultures were not taken.  Continue with the Unasyn.  For now we will discontinue the vancomycin unless MRSA is isolated.  Check EKG.  I have reviewed all the records      Discussed with IM. Will continue to follow    Stephanie Best M.D.

## 2018-07-10 NOTE — PROGRESS NOTES
Assumed care of pt at 0700. Received bedside report from nightshift RN. Pt is A&O X4. Pt has a wound vac on the R. Knee. Pt denies pain at this time. Treaded slipper socks in use. Call light is within reach. Bed is locked and in the lowest position. Hourly rounding in place.

## 2018-07-10 NOTE — ASSESSMENT & PLAN NOTE
Possible from hyperglycemia  Also concern of dehydration  IV fluid rehydration finished and the patient's sodium level improved significantly.

## 2018-07-10 NOTE — PROGRESS NOTES
Safekeeping Sandy,  Kera, and unit clerk Jessica counted and collected $2,883 from patient as well as a wallet and one checkbook. Safekeeping slip placed in patient's chart, patient in pre-op.

## 2018-07-10 NOTE — PROGRESS NOTES
2 RN skin check completed.   Bilateral foot dryness  BLE generalized discoloration  R knee wound, Wound bed is black with purulent drainage. periwound redness spreading from mid anterior shin to mid lateral thigh. R lateral knee has a small puncture like wound draining purulent drainage. No other open areas.  Generalized redness and dryness throughout body

## 2018-07-10 NOTE — PROGRESS NOTES
Orthopaedic Staff  Patient seen and examined- agree with findings and treatment plan  Right  Answered all questions  rule out infected knee joint, debreidment of infected prepatella bursa  Justin

## 2018-07-10 NOTE — PROGRESS NOTES
Assumed care of pt at 14:30 today. Pt is A&O x4, room air. Pt denies any pain at this time. RN completed assessment and admitting documentation. Treaded slipper socks provided. Bed alarm in use. Pt educated on fall risk. Hourly rounding in place.

## 2018-07-10 NOTE — PROGRESS NOTES
"Pharmacy Kinetics 62 y.o. male on vancomycin day # 2    7/10/2018    Currently on Vancomycin 2900 mg IV x1 followed by 1700 mg IV q12h (patient did get 1000 mg IV at about 0900 this  AM)    Indication for Treatment: R septic knee    Pertinent history per medical record: Admitted on 2018 for R knee infection. Patient presented to [place] for right knee infection and started on keflex/clinda without improvement. He has gotten progressively worse with purulent material draining from knee. Ortho has been consulted and is taking patient for aspiration, I&D, ? Wound vac placement tonight. Had knee arthroplasty in . I&D on . ID consulted.    Other antibiotics: Unasyn 3 g IV q6h    Allergies: Patient has no known allergies.     List concerns for renal function: BMI, age    Pertinent cultures to date:   18: Tissue, R knee = group B strep  18: Wound, R leg = group B strep    Recent Labs      07/10/18   0228   WBC  5.4   NEUTSPOLYS  84.40*     Recent Labs      07/10/18   0228   BUN  20   CREATININE  0.85   ALBUMIN  3.1*     Intake/Output Summary (Last 24 hours) at 07/10/18 1723  Last data filed at 07/10/18 0900   Gross per 24 hour   Intake             1760 ml   Output              700 ml   Net             1060 ml      Blood pressure 124/68, pulse 83, temperature 36.3 °C (97.4 °F), resp. rate 15, height 1.822 m (5' 11.73\"), weight 114 kg (251 lb 5.2 oz), SpO2 93 %. Temp (24hrs), Av.4 °C (97.5 °F), Min:36.1 °C (97 °F), Max:36.6 °C (97.8 °F)    A/P   1. Vancomycin dose change: new start  2. Next vancomycin level: tomorrow at 1130 (prior to 3rd total dose)  3. Goal trough: ~16 mcg/mL (? Septic joint, but no notes yet)  4. Comments: ID consulted? Ortho to take back on  for repeat I&D of knee. Group B strep from cx so far. Renal stable. Trough tomorrow.    Daryl Michel, PharmD, BCPS      "

## 2018-07-10 NOTE — ASSESSMENT & PLAN NOTE
a1c 11.93  DM diet  Lantus 15iu with ISS to cover  Monitor BS  Patient has type 2 diabetes without long-term use of insulin without complication  Patient's blood sugar not controlled with hyperglycemia, however currently better controlled.

## 2018-07-10 NOTE — PROGRESS NOTES
[facility]    Date of Service: 7/10/2018    Chief Complaint  62 y.o. male admitted 2018 with signs of knee infection.  Patient received surgery I&D.  Infectious disease specialist was consulted.    Interval Problem Update  7/10 patient has been stable overnight, sleepy during the day.  has wound VAC in place. Ortho on. Patient's pain is local, 6-8/10, intermittent and does not radiate to other location, sharp and with some tingling. Can be controlled by pain meds. Dressing in place. Patient otherwise denies fever, chills, nausea, vomiting, adb pain, SOB, CP, headache, constipation, diarrhea, cough, or sputum.    Consultants/Specialty  Ortho  Id     Disposition  tbd        Review of Systems   Constitutional: Negative for chills, fever and weight loss.   HENT: Negative for congestion, ear discharge, ear pain, hearing loss and nosebleeds.    Eyes: Negative for blurred vision and pain.   Respiratory: Negative for cough, sputum production, shortness of breath and wheezing.    Cardiovascular: Negative for chest pain, palpitations, leg swelling and PND.   Gastrointestinal: Negative for abdominal pain, constipation, heartburn, nausea and vomiting.   Genitourinary: Negative for dysuria, frequency and hematuria.   Musculoskeletal: Positive for joint pain. Negative for back pain, falls and neck pain.   Skin: Negative for rash.   Neurological: Negative for dizziness, tremors, speech change, seizures, weakness and headaches.   Psychiatric/Behavioral: Negative for depression, substance abuse and suicidal ideas.      Physical Exam  Laboratory/Imaging   Hemodynamics  Temp (24hrs), Av.5 °C (97.7 °F), Min:36.1 °C (97 °F), Max:36.9 °C (98.5 °F)   Temperature: 36.3 °C (97.3 °F)  Pulse  Av.9  Min: 71  Max: 90 Heart Rate (Monitored): 74  Blood Pressure: 130/68, NIBP: 113/62      Respiratory      Respiration: 15, Pulse Oximetry: 96 %     Work Of Breathing / Effort: Mild  RUL Breath Sounds: Clear, RML  Breath Sounds: Clear, RLL Breath Sounds: Diminished, FIDELINA Breath Sounds: Clear, LLL Breath Sounds: Diminished    Fluids    Intake/Output Summary (Last 24 hours) at 07/10/18 0846  Last data filed at 07/10/18 0600   Gross per 24 hour   Intake             1400 ml   Output                0 ml   Net             1400 ml       Nutrition  Orders Placed This Encounter   Procedures   • Diet Order Diabetic     Standing Status:   Standing     Number of Occurrences:   1     Order Specific Question:   Diet:     Answer:   Diabetic [3]     Physical Exam   Constitutional: He is oriented to person, place, and time. He appears well-developed and well-nourished.   HENT:   Head: Normocephalic.   Eyes: EOM are normal. Pupils are equal, round, and reactive to light.   Neck: Normal range of motion. Neck supple. No JVD present. No thyromegaly present.   Cardiovascular: Normal rate, regular rhythm and normal heart sounds.  Exam reveals no gallop and no friction rub.    No murmur heard.  Pulmonary/Chest: Effort normal and breath sounds normal. No respiratory distress. He has no wheezes. He has no rales. He exhibits no tenderness.   Abdominal: Soft. Bowel sounds are normal. He exhibits no distension and no mass. There is no tenderness. There is no rebound and no guarding.   Musculoskeletal: He exhibits no edema.   Right knee tender to touch, warm to touch, possible infection, status post surgery.   Lymphadenopathy:     He has no cervical adenopathy.   Neurological: He is alert and oriented to person, place, and time. He displays normal reflexes. No cranial nerve deficit.   Skin: Skin is dry. No rash noted.   Psychiatric: He has a normal mood and affect.   Nursing note and vitals reviewed.      Recent Labs      07/10/18   0228   WBC  5.4   RBC  4.55*   HEMOGLOBIN  13.6*   HEMATOCRIT  41.9*   MCV  92.1   MCH  29.9   MCHC  32.5*   RDW  44.4   PLATELETCT  220   MPV  10.3     Recent Labs      07/10/18   0228   SODIUM  132*   POTASSIUM  4.7    CHLORIDE  101   CO2  22   GLUCOSE  249*   BUN  20   CREATININE  0.85   CALCIUM  8.6     Recent Labs      07/09/18   1558   INR  1.06                  Assessment/Plan     * Infection of right knee (HCC)- (present on admission)   Assessment & Plan    s/p I/D  POD 1  Ortho on  Culture pending  ID on  Close monitor  Cont iv abx        Hyponatremia- (present on admission)   Assessment & Plan    Possible from hyperglycemia  Also concern of dehydration  IV fluid rehydration.        Type 2 diabetes mellitus, without long-term current use of insulin (HCC)- (present on admission)   Assessment & Plan    a1c 11.93  DM diet  Lantus 10iu started with ISS to cover  Monitor BS  Patient has type 2 diabetes without long-term use of insulin without complication  Patient's blood sugar not controlled with hyperglycemia          Hyperglycemia- (present on admission)   Assessment & Plan    Glucose was 223 in the ER at [place]  Sliding scale insulin ordered  HbA1c pending          Quality-Core Measures   Reviewed items::  Labs reviewed, Medications reviewed and Radiology images reviewed  Vivas catheter::  No Vivas  DVT prophylaxis pharmacological::  Enoxaparin (Lovenox)  DVT prophylaxis - mechanical:  SCDs  Antibiotics:  Treating active infection/contamination beyond 24 hours perioperative coverage   For complexity-based billing, please refer to the history, exam, and decison making above. In addition, I spent >35 minutes caring for the patient today. More than 50% of the time was spent counseling and coordinating care.    I have discussed with RN and HOMERO and SW and other consultants about patient's plan.

## 2018-07-10 NOTE — CARE PLAN
Problem: Safety  Goal: Will remain free from injury  Outcome: PROGRESSING AS EXPECTED  RN educated pt on fall risk. Pt verbalized understanding of calling for assistance.     Problem: Medication  Goal: Compliance with prescribed medication will improve  Outcome: PROGRESSING AS EXPECTED  RN educating pt about different types of insulins and how to check BS.

## 2018-07-10 NOTE — OR SURGEON
Immediate Post OP Note    PreOp Diagnosis: infected prepatellar bursa- rule out prosthetic infection    PostOp Diagnosis: same    Procedure(s):  IRRIGATION & DEBRIDEMENT W/ WOUND VAC  Of prepatellar bursa infection    Surgeon(s):  SARA Arriaga M.D. surgeon    Anesthesiologist/Type of Anesthesia:  Anesthesiologist: Mazin Mayberry M.D./* No anesthesia type entered *    Surgical Staff:  Circulator: Azucena Davis R.N.  Scrub Person: Jose Kay R.N.  Count [place]: Ina Holliday    Specimens removed if any:  * No specimens in log *    Estimated Blood Loss: minimal    Findings: as described    Complications: none        7/9/2018 8:15 PM Riley Justin M.D.

## 2018-07-11 LAB
ANION GAP SERPL CALC-SCNC: 9 MMOL/L (ref 0–11.9)
BACTERIA TISS AEROBE CULT: ABNORMAL
BACTERIA TISS AEROBE CULT: ABNORMAL
BASOPHILS # BLD AUTO: 0.8 % (ref 0–1.8)
BASOPHILS # BLD: 0.04 K/UL (ref 0–0.12)
BUN SERPL-MCNC: 16 MG/DL (ref 8–22)
CALCIUM SERPL-MCNC: 8.9 MG/DL (ref 8.5–10.5)
CHLORIDE SERPL-SCNC: 104 MMOL/L (ref 96–112)
CO2 SERPL-SCNC: 24 MMOL/L (ref 20–33)
CREAT SERPL-MCNC: 0.84 MG/DL (ref 0.5–1.4)
EKG IMPRESSION: NORMAL
EOSINOPHIL # BLD AUTO: 0.12 K/UL (ref 0–0.51)
EOSINOPHIL NFR BLD: 2.4 % (ref 0–6.9)
ERYTHROCYTE [DISTWIDTH] IN BLOOD BY AUTOMATED COUNT: 45.1 FL (ref 35.9–50)
GLUCOSE BLD-MCNC: 156 MG/DL (ref 65–99)
GLUCOSE BLD-MCNC: 163 MG/DL (ref 65–99)
GLUCOSE BLD-MCNC: 186 MG/DL (ref 65–99)
GLUCOSE BLD-MCNC: 253 MG/DL (ref 65–99)
GLUCOSE SERPL-MCNC: 222 MG/DL (ref 65–99)
GRAM STN SPEC: ABNORMAL
HCT VFR BLD AUTO: 41.8 % (ref 42–52)
HGB BLD-MCNC: 13.5 G/DL (ref 14–18)
IMM GRANULOCYTES # BLD AUTO: 0.07 K/UL (ref 0–0.11)
IMM GRANULOCYTES NFR BLD AUTO: 1.4 % (ref 0–0.9)
LYMPHOCYTES # BLD AUTO: 1.7 K/UL (ref 1–4.8)
LYMPHOCYTES NFR BLD: 33.8 % (ref 22–41)
MCH RBC QN AUTO: 30.1 PG (ref 27–33)
MCHC RBC AUTO-ENTMCNC: 32.3 G/DL (ref 33.7–35.3)
MCV RBC AUTO: 93.3 FL (ref 81.4–97.8)
MONOCYTES # BLD AUTO: 0.49 K/UL (ref 0–0.85)
MONOCYTES NFR BLD AUTO: 9.7 % (ref 0–13.4)
NEUTROPHILS # BLD AUTO: 2.61 K/UL (ref 1.82–7.42)
NEUTROPHILS NFR BLD: 51.9 % (ref 44–72)
NRBC # BLD AUTO: 0 K/UL
NRBC BLD-RTO: 0 /100 WBC
PLATELET # BLD AUTO: 241 K/UL (ref 164–446)
PMV BLD AUTO: 10.4 FL (ref 9–12.9)
POTASSIUM SERPL-SCNC: 4 MMOL/L (ref 3.6–5.5)
RBC # BLD AUTO: 4.48 M/UL (ref 4.7–6.1)
SIGNIFICANT IND 70042: ABNORMAL
SITE SITE: ABNORMAL
SODIUM SERPL-SCNC: 137 MMOL/L (ref 135–145)
SOURCE SOURCE: ABNORMAL
VANCOMYCIN TROUGH SERPL-MCNC: 7.6 UG/ML (ref 10–20)
WBC # BLD AUTO: 5 K/UL (ref 4.8–10.8)

## 2018-07-11 PROCEDURE — 99233 SBSQ HOSP IP/OBS HIGH 50: CPT | Performed by: INTERNAL MEDICINE

## 2018-07-11 PROCEDURE — 82962 GLUCOSE BLOOD TEST: CPT | Mod: 91

## 2018-07-11 PROCEDURE — 700102 HCHG RX REV CODE 250 W/ 637 OVERRIDE(OP): Performed by: INTERNAL MEDICINE

## 2018-07-11 PROCEDURE — 80048 BASIC METABOLIC PNL TOTAL CA: CPT

## 2018-07-11 PROCEDURE — 85025 COMPLETE CBC W/AUTO DIFF WBC: CPT

## 2018-07-11 PROCEDURE — 700105 HCHG RX REV CODE 258: Performed by: HOSPITALIST

## 2018-07-11 PROCEDURE — 770006 HCHG ROOM/CARE - MED/SURG/GYN SEMI*

## 2018-07-11 PROCEDURE — 36415 COLL VENOUS BLD VENIPUNCTURE: CPT

## 2018-07-11 PROCEDURE — 700111 HCHG RX REV CODE 636 W/ 250 OVERRIDE (IP): Performed by: HOSPITALIST

## 2018-07-11 PROCEDURE — 700111 HCHG RX REV CODE 636 W/ 250 OVERRIDE (IP): Performed by: INTERNAL MEDICINE

## 2018-07-11 PROCEDURE — 80202 ASSAY OF VANCOMYCIN: CPT

## 2018-07-11 RX ORDER — INSULIN GLARGINE 100 [IU]/ML
15 INJECTION, SOLUTION SUBCUTANEOUS EVERY EVENING
Status: DISCONTINUED | OUTPATIENT
Start: 2018-07-11 | End: 2018-07-17 | Stop reason: HOSPADM

## 2018-07-11 RX ADMIN — INSULIN GLARGINE 15 UNITS: 100 INJECTION, SOLUTION SUBCUTANEOUS at 20:35

## 2018-07-11 RX ADMIN — AMPICILLIN SODIUM AND SULBACTAM SODIUM 3 G: 2; 1 INJECTION, POWDER, FOR SOLUTION INTRAMUSCULAR; INTRAVENOUS at 00:03

## 2018-07-11 RX ADMIN — ENOXAPARIN SODIUM 40 MG: 100 INJECTION SUBCUTANEOUS at 05:04

## 2018-07-11 RX ADMIN — INSULIN HUMAN 7 UNITS: 100 INJECTION, SOLUTION PARENTERAL at 12:13

## 2018-07-11 RX ADMIN — AMPICILLIN SODIUM AND SULBACTAM SODIUM 3 G: 2; 1 INJECTION, POWDER, FOR SOLUTION INTRAMUSCULAR; INTRAVENOUS at 05:04

## 2018-07-11 RX ADMIN — AMPICILLIN SODIUM AND SULBACTAM SODIUM 3 G: 2; 1 INJECTION, POWDER, FOR SOLUTION INTRAMUSCULAR; INTRAVENOUS at 17:20

## 2018-07-11 RX ADMIN — INSULIN HUMAN 3 UNITS: 100 INJECTION, SOLUTION PARENTERAL at 08:00

## 2018-07-11 RX ADMIN — INSULIN HUMAN 3 UNITS: 100 INJECTION, SOLUTION PARENTERAL at 20:35

## 2018-07-11 RX ADMIN — AMPICILLIN SODIUM AND SULBACTAM SODIUM 3 G: 2; 1 INJECTION, POWDER, FOR SOLUTION INTRAMUSCULAR; INTRAVENOUS at 12:09

## 2018-07-11 RX ADMIN — INSULIN HUMAN 3 UNITS: 100 INJECTION, SOLUTION PARENTERAL at 17:26

## 2018-07-11 ASSESSMENT — ENCOUNTER SYMPTOMS
DIZZINESS: 0
BLURRED VISION: 0
DEPRESSION: 0
NECK PAIN: 0
BACK PAIN: 0
HEADACHES: 0
TREMORS: 0
COUGH: 0
PALPITATIONS: 0
SPUTUM PRODUCTION: 0
SPEECH CHANGE: 0
WEIGHT LOSS: 0
CONSTIPATION: 0
EYE PAIN: 0
SEIZURES: 0
VOMITING: 0
NAUSEA: 0
ABDOMINAL PAIN: 0
WHEEZING: 0
FALLS: 0
SENSORY CHANGE: 0
SHORTNESS OF BREATH: 0
HEARTBURN: 0
FEVER: 0
PND: 0
WEAKNESS: 0

## 2018-07-11 ASSESSMENT — PAIN SCALES - GENERAL
PAINLEVEL_OUTOF10: 0
PAINLEVEL_OUTOF10: 0

## 2018-07-11 ASSESSMENT — LIFESTYLE VARIABLES: SUBSTANCE_ABUSE: 0

## 2018-07-11 NOTE — PROGRESS NOTES
[facility]    Date of Service: 2018    Chief Complaint  62 y.o. male admitted 2018 with signs of knee infection.  Patient received surgery I&D.  Infectious disease specialist was consulted.    Interval Problem Update  7/10 patient has been stable overnight, sleepy during the day.  has wound VAC in place. Ortho on. Patient's pain is local, 6-8/10, intermittent and does not radiate to other location, sharp and with some tingling. Can be controlled by pain meds. Dressing in place. Patient otherwise denies fever, chills, nausea, vomiting, adb pain, SOB, CP, headache, constipation, diarrhea, cough, or sputum.   patient has been stable overnight. Patient's pain is local, 6-8/10, intermittent and does not radiate to other location, sharp and with some tingling. Can be controlled by pain meds. Dressing in place.  Patient otherwise denies fever, chills, nausea, vomiting, adb pain, SOB, CP, headache, constipation, diarrhea, cough, or sputum.    Consultants/Specialty  Ortho  Id     Disposition  tbd        Review of Systems   Constitutional: Negative for fever and weight loss.   HENT: Negative for ear pain and hearing loss.    Eyes: Negative for blurred vision and pain.   Respiratory: Negative for cough, sputum production, shortness of breath and wheezing.    Cardiovascular: Negative for chest pain, palpitations, leg swelling and PND.   Gastrointestinal: Negative for abdominal pain, constipation, heartburn and nausea.   Genitourinary: Negative for dysuria, frequency and hematuria.   Musculoskeletal: Positive for joint pain. Negative for back pain, falls and neck pain.   Skin: Negative for rash.   Neurological: Negative for dizziness, tremors, speech change, seizures, weakness and headaches.   Psychiatric/Behavioral: Negative for depression, substance abuse and suicidal ideas.      Physical Exam  Laboratory/Imaging   Hemodynamics  Temp (24hrs), Av.5 °C (97.7 °F), Min:36.3 °C (97.3 °F),  Max:36.8 °C (98.2 °F)   Temperature: 36.3 °C (97.3 °F)  Pulse  Av.9  Min: 71  Max: 90    Blood Pressure: 141/67      Respiratory      Respiration: 18, Pulse Oximetry: 95 %        RUL Breath Sounds: Clear, RML Breath Sounds: Clear, RLL Breath Sounds: Diminished, FIDELINA Breath Sounds: Clear, LLL Breath Sounds: Diminished    Fluids    Intake/Output Summary (Last 24 hours) at 18 0933  Last data filed at 18 0400   Gross per 24 hour   Intake              600 ml   Output             1600 ml   Net            -1000 ml       Nutrition  Orders Placed This Encounter   Procedures   • Diet Order Diabetic     Standing Status:   Standing     Number of Occurrences:   1     Order Specific Question:   Diet:     Answer:   Diabetic [3]     Physical Exam   Constitutional: He is oriented to person, place, and time. He appears well-developed. No distress.   HENT:   Head: Normocephalic.   Eyes: EOM are normal. Pupils are equal, round, and reactive to light.   Neck: Normal range of motion. Neck supple. No JVD present. No thyromegaly present.   Cardiovascular: Normal rate, regular rhythm and normal heart sounds.  Exam reveals no gallop.    No murmur heard.  Pulmonary/Chest: Effort normal and breath sounds normal. No respiratory distress. He has no wheezes. He exhibits no tenderness.   Abdominal: Soft. Bowel sounds are normal. He exhibits no distension and no mass. There is no rebound and no guarding.   Musculoskeletal: He exhibits no edema.   Right knee tender to touch, warm to touch, possible infection, status post surgery.   Lymphadenopathy:     He has no cervical adenopathy.   Neurological: He is alert and oriented to person, place, and time. He displays normal reflexes. No cranial nerve deficit.   Skin: Skin is dry. No rash noted.   Psychiatric: He has a normal mood and affect.   Nursing note and vitals reviewed.      Recent Labs      07/10/18   0228  18   0306   WBC  5.4  5.0   RBC  4.55*  4.48*   HEMOGLOBIN  13.6*   13.5*   HEMATOCRIT  41.9*  41.8*   MCV  92.1  93.3   MCH  29.9  30.1   MCHC  32.5*  32.3*   RDW  44.4  45.1   PLATELETCT  220  241   MPV  10.3  10.4     Recent Labs      07/10/18   0228  07/11/18   0306   SODIUM  132*  137   POTASSIUM  4.7  4.0   CHLORIDE  101  104   CO2  22  24   GLUCOSE  249*  222*   BUN  20  16   CREATININE  0.85  0.84   CALCIUM  8.6  8.9     Recent Labs      07/09/18   1558   INR  1.06                  Assessment/Plan     * Infection of right knee (HCC)- (present on admission)   Assessment & Plan    s/p I/D  POD 2  Ortho on  Culture pending  ID on  Close monitor  Cont iv abx        Hyponatremia- (present on admission)   Assessment & Plan    Resolved Possible from hyperglycemia  Also concern of dehydration  IV fluid rehydration finished        Type 2 diabetes mellitus, without long-term current use of insulin ([facility])- (present on admission)   Assessment & Plan    a1c 11.93  DM diet  Lantus 15iu with ISS to cover  Monitor BS  Patient has type 2 diabetes without long-term use of insulin without complication  Patient's blood sugar not controlled with hyperglycemia        Hyperglycemia- (present on admission)   Assessment & Plan    Glucose was 223 in the ER at [place]  Sliding scale insulin ordered  HbA1c 11.9          Quality-Core Measures   Reviewed items::  Labs reviewed, Medications reviewed and Radiology images reviewed  Vivas catheter::  No Vivas  DVT prophylaxis pharmacological::  Enoxaparin (Lovenox)  DVT prophylaxis - mechanical:  SCDs  Antibiotics:  Treating active infection/contamination beyond 24 hours perioperative coverage   For complexity-based billing, please refer to the history, exam, and decison making above. In addition, I spent >35 minutes caring for the patient today. More than 50% of the time was spent counseling and coordinating care.    I have discussed with RN and CM and SW and other consultants about patient's plan.

## 2018-07-11 NOTE — PROGRESS NOTES
Pt AOx4, no complaint of pain/discomfort. Right knee wound vac patent, minimal sanguinous drainage noted. Pt resting quietly in bed. Call light within reach, personal belongings available, bed in lowest position, treaded socks on, and bed alarm on. Hourly rounding in place.

## 2018-07-11 NOTE — PROGRESS NOTES
Infectious Disease Progress Note    Author: Stephanie Best M.D. Date & Time of service: 2018  12:14 PM    Chief Complaint:  Right knee infection    Interval History:  2018 T-max 98.2 WBC 5 platelets 241 feels slightly better  Labs Reviewed, Medications Reviewed, Radiology Reviewed and Wound Reviewed.    Review of Systems:  Review of Systems   Constitutional: Positive for malaise/fatigue. Negative for fever.   Respiratory: Negative for cough.    Cardiovascular: Negative for chest pain.   Gastrointestinal: Negative for abdominal pain, nausea and vomiting.   Genitourinary: Negative for dysuria.   Musculoskeletal: Positive for joint pain.        Right knee hurts   Neurological: Negative for sensory change and speech change.       Hemodynamics:  Temp (24hrs), Av.5 °C (97.7 °F), Min:36.3 °C (97.3 °F), Max:36.8 °C (98.2 °F)  Temperature: 36.3 °C (97.3 °F)  Pulse  Av.9  Min: 71  Max: 90   Blood Pressure: 141/67       Physical Exam:  Physical Exam   Constitutional: He is oriented to person, place, and time. No distress.   HENT:   Mouth/Throat: No oropharyngeal exudate.   Eyes: No scleral icterus.   Neck: Neck supple.   Cardiovascular: Regular rhythm.    No murmur heard.  Pulmonary/Chest: He has no wheezes. He has no rales.   Abdominal: Soft. There is no tenderness.   Musculoskeletal: He exhibits edema.   Right knee wound VAC present   Neurological: He is alert and oriented to person, place, and time. No cranial nerve deficit. Coordination normal.   Vitals reviewed.      Meds:    Current Facility-Administered Medications:   •  insulin glargine  •  enoxaparin (LOVENOX) injection  •  senna-docusate **AND** polyethylene glycol/lytes **AND** magnesium hydroxide **AND** bisacodyl  •  ondansetron  •  ondansetron  •  promethazine  •  promethazine  •  prochlorperazine  •  insulin regular **AND** Accu-Chek ACHS **AND** NOTIFY MD and PharmD **AND** glucose 4 g **AND** dextrose 50%  •  acetaminophen  •   ampicillin-sulbactam (UNASYN) IV  •  oxyCODONE immediate-release  •  morphine injection    Labs:  Recent Labs      07/10/18   0228  07/11/18   0306   WBC  5.4  5.0   RBC  4.55*  4.48*   HEMOGLOBIN  13.6*  13.5*   HEMATOCRIT  41.9*  41.8*   MCV  92.1  93.3   MCH  29.9  30.1   RDW  44.4  45.1   PLATELETCT  220  241   MPV  10.3  10.4   NEUTSPOLYS  84.40*  51.90   LYMPHOCYTES  11.70*  33.80   MONOCYTES  2.20  9.70   EOSINOPHILS  0.20  2.40   BASOPHILS  0.20  0.80     Recent Labs      07/10/18   0228  07/11/18   0306   SODIUM  132*  137   POTASSIUM  4.7  4.0   CHLORIDE  101  104   CO2  22  24   GLUCOSE  249*  222*   BUN  20  16     Recent Labs      07/10/18   0228  07/11/18   0306   ALBUMIN  3.1*   --    TBILIRUBIN  0.3   --    ALKPHOSPHAT  63   --    TOTPROTEIN  6.8   --    ALTSGPT  16   --    ASTSGOT  12   --    CREATININE  0.85  0.84       Imaging:  Dx-knee 2- Right    Result Date: 7/9/2018 7/9/2018 4:35 PM HISTORY/REASON FOR EXAM: Anterior knee pain without injury. TECHNIQUE/EXAM DESCRIPTION AND NUMBER OF VIEWS:  2 views of the right knee. COMPARISON: None FINDINGS: Anterior to the patella there is soft tissue swelling and gas. Subjacent edema Total knee arthroplasty with patellar resurfacing has been performed.  The tibial component is cemented. There is also cement anterior to the tibial tubercle/proximal tibia No periprosthetic fracture is detected. There is question of lucency underlying the medial margin of the tibial component Ossification seen superomedial to the femur is likely post traumatic     Prepatellar soft tissue swelling and gas is compatible with infection and/or laceration. No gross intra-articular extension is seen Total knee arthroplasty. Lucency underlying the medial margin of the tibia could be chronic or secondary to loosening. Comparison with prior x-rays would prove helpful       Micro:  Results     Procedure Component Value Units Date/Time    ANAEROBIC CULTURE [[id number]] Collected:   07/09/18 1956    Order Status:  Completed Specimen:  Tissue Updated:  07/11/18 1020     Significant Indicator NEG     Source TISS     Site Right Knee Tissue     Anaerobic Culture, Culture Res Culture in progress.    AFB CULTURE [[id number]] Collected:  07/09/18 1956    Order Status:  Completed Specimen:  Tissue Updated:  07/11/18 1020     Significant Indicator NEG     Source TISS     Site Right Knee Tissue     AFB Culture Culture in progress.     AFB Smear Results No acid fast bacilli seen.    FUNGAL CULTURE [[id number]] Collected:  07/09/18 1956    Order Status:  Completed Specimen:  Tissue Updated:  07/11/18 1020     Significant Indicator NEG     Source TISS     Site Right Knee Tissue     Fungal Culture Culture in progress.    CULTURE TISSUE W/ GRM STAIN [[id number]]  (Abnormal) Collected:  07/09/18 1956    Order Status:  Completed Specimen:  Tissue Updated:  07/11/18 1020     Significant Indicator POS (POS)     Source TISS     Site Right Knee Tissue     Tissue Culture -- (A)     Gram Stain Result Few WBCs.  No organisms seen.       Tissue Culture Streptococcus agalactiae (Group B)  Moderate growth   (A)    CULTURE WOUND W/ GRAM STAIN [[id number]]  (Abnormal) Collected:  07/09/18 1550    Order Status:  Completed Specimen:  Wound Updated:  07/11/18 1000     Significant Indicator POS (POS)     Source WND     Site Right Leg     Culture Result Wound -- (A)     Gram Stain Result Many WBCs.  Rare Gram positive cocci.       Culture Result Wound Streptococcus agalactiae (Group B)  Moderate growth   (A)      Staphylococcus aureus  Light growth   (A)    GRAM STAIN [[id number]] Collected:  07/09/18 1956    Order Status:  Completed Specimen:  Tissue Updated:  07/10/18 2017     Significant Indicator .     Source TISS     Site Right Knee Tissue     Gram Stain Result Few WBCs.  No organisms seen.      ACID FAST STAIN [[id number]] Collected:  07/09/18 1956    Order Status:  Completed Specimen:  Tissue Updated:  07/10/18 2017      Significant Indicator NEG     Source TISS     Site Right Knee Tissue     AFB Smear Results No acid fast bacilli seen.    GRAM STAIN [[id number]] Collected:  07/09/18 1550    Order Status:  Completed Specimen:  Wound Updated:  07/09/18 2156     Significant Indicator .     Source WND     Site Right Leg     Gram Stain Result Many WBCs.  Rare Gram positive cocci.      CULTURE WOUND W/O GRAM STAIN [[id number]]     Order Status:  No result Specimen:  Wound from Right Leg           Assessment:  Active Hospital Problems    Diagnosis   • *Infection of right knee (HCC) [M00.9]   • Type 2 diabetes mellitus, without long-term current use of insulin (HCC) [E11.9]   • Hyponatremia [E87.1]   • Hyperglycemia [R73.9]       Plan:  Prepatellar septic bursitis  Patient has a prosthetic knee in place  Underwent I&D on 7/9/2018  Cultures for group B strep and staph aureus  Currently on Unasyn  It is unclear whether the knee is infected or not  We will discuss with the orthopedics    Newly diagnosed diabetes mellitus  Keep blood sugars less than 150 for wound healing    Discussed with internal medicine.

## 2018-07-11 NOTE — CARE PLAN
Problem: Safety  Goal: Will remain free from falls  Safety precautions in place. Bed in low position, treaded socks on, personal possessions in reach, call light in reach and pt using it to call for assistance. Bed alarm on.    Problem: Pain Management  Goal: Pain level will decrease to patient's comfort goal  Pt denies pain at this time.

## 2018-07-11 NOTE — PROGRESS NOTES
Seen & examined  Pain controlled    Vitals:    07/10/18 1600 07/10/18 1915 07/11/18 0330 07/11/18 0700   BP: 124/68 136/73 134/75 141/67   Pulse: 83 79 78 79   Resp: 15 20 18 18   Temp: 36.3 °C (97.4 °F) 36.7 °C (98 °F) 36.8 °C (98.2 °F) 36.3 °C (97.3 °F)   SpO2: 93% 91% 91% 95%   Weight:       Height:         RLE:  Wound vac intact to suction  Improved erythema and swelling  f/e ankle, toes  Foot w/w/p    POD#2 s/p R prepatella bursectomy, I&D.  Dry aspiration of joint.    - OR cultures - +Strep agalactiae, Staph  - Fascia intact, knee aspiration was dry.  TKA does not appear to be infected at this time.  - WBAT  - Continue wound vac at current settings   - IV antibiotics per ID recommendations  - Plan to return to OR on tomorrow 7/12 for repeat I&D with closure possible skin grafting, NPO p MN

## 2018-07-12 LAB
ANION GAP SERPL CALC-SCNC: 9 MMOL/L (ref 0–11.9)
BACTERIA SPEC ANAEROBE CULT: NORMAL
BASOPHILS # BLD AUTO: 0.9 % (ref 0–1.8)
BASOPHILS # BLD: 0.05 K/UL (ref 0–0.12)
BUN SERPL-MCNC: 14 MG/DL (ref 8–22)
CALCIUM SERPL-MCNC: 8.9 MG/DL (ref 8.5–10.5)
CHLORIDE SERPL-SCNC: 103 MMOL/L (ref 96–112)
CO2 SERPL-SCNC: 25 MMOL/L (ref 20–33)
CREAT SERPL-MCNC: 0.88 MG/DL (ref 0.5–1.4)
EOSINOPHIL # BLD AUTO: 0.13 K/UL (ref 0–0.51)
EOSINOPHIL NFR BLD: 2.4 % (ref 0–6.9)
ERYTHROCYTE [DISTWIDTH] IN BLOOD BY AUTOMATED COUNT: 45.1 FL (ref 35.9–50)
GLUCOSE BLD-MCNC: 130 MG/DL (ref 65–99)
GLUCOSE BLD-MCNC: 132 MG/DL (ref 65–99)
GLUCOSE BLD-MCNC: 142 MG/DL (ref 65–99)
GLUCOSE BLD-MCNC: 165 MG/DL (ref 65–99)
GLUCOSE SERPL-MCNC: 176 MG/DL (ref 65–99)
HCT VFR BLD AUTO: 43.7 % (ref 42–52)
HGB BLD-MCNC: 14.1 G/DL (ref 14–18)
IMM GRANULOCYTES # BLD AUTO: 0.1 K/UL (ref 0–0.11)
IMM GRANULOCYTES NFR BLD AUTO: 1.8 % (ref 0–0.9)
LYMPHOCYTES # BLD AUTO: 1.73 K/UL (ref 1–4.8)
LYMPHOCYTES NFR BLD: 31.5 % (ref 22–41)
MCH RBC QN AUTO: 30.1 PG (ref 27–33)
MCHC RBC AUTO-ENTMCNC: 32.3 G/DL (ref 33.7–35.3)
MCV RBC AUTO: 93.2 FL (ref 81.4–97.8)
MONOCYTES # BLD AUTO: 0.6 K/UL (ref 0–0.85)
MONOCYTES NFR BLD AUTO: 10.9 % (ref 0–13.4)
NEUTROPHILS # BLD AUTO: 2.89 K/UL (ref 1.82–7.42)
NEUTROPHILS NFR BLD: 52.5 % (ref 44–72)
NRBC # BLD AUTO: 0 K/UL
NRBC BLD-RTO: 0 /100 WBC
PLATELET # BLD AUTO: 278 K/UL (ref 164–446)
PMV BLD AUTO: 10.1 FL (ref 9–12.9)
POTASSIUM SERPL-SCNC: 4 MMOL/L (ref 3.6–5.5)
RBC # BLD AUTO: 4.69 M/UL (ref 4.7–6.1)
SIGNIFICANT IND 70042: NORMAL
SITE SITE: NORMAL
SODIUM SERPL-SCNC: 137 MMOL/L (ref 135–145)
SOURCE SOURCE: NORMAL
WBC # BLD AUTO: 5.5 K/UL (ref 4.8–10.8)

## 2018-07-12 PROCEDURE — 501445 HCHG STAPLER, SKIN DISP: Performed by: ORTHOPAEDIC SURGERY

## 2018-07-12 PROCEDURE — 85025 COMPLETE CBC W/AUTO DIFF WBC: CPT

## 2018-07-12 PROCEDURE — 160048 HCHG OR STATISTICAL LEVEL 1-5: Performed by: ORTHOPAEDIC SURGERY

## 2018-07-12 PROCEDURE — 160009 HCHG ANES TIME/MIN: Performed by: ORTHOPAEDIC SURGERY

## 2018-07-12 PROCEDURE — 160027 HCHG SURGERY MINUTES - 1ST 30 MINS LEVEL 2: Performed by: ORTHOPAEDIC SURGERY

## 2018-07-12 PROCEDURE — 160035 HCHG PACU - 1ST 60 MINS PHASE I: Performed by: ORTHOPAEDIC SURGERY

## 2018-07-12 PROCEDURE — A6223 GAUZE >16<=48 NO W/SAL W/O B: HCPCS | Performed by: ORTHOPAEDIC SURGERY

## 2018-07-12 PROCEDURE — 700102 HCHG RX REV CODE 250 W/ 637 OVERRIDE(OP)

## 2018-07-12 PROCEDURE — 160038 HCHG SURGERY MINUTES - EA ADDL 1 MIN LEVEL 2: Performed by: ORTHOPAEDIC SURGERY

## 2018-07-12 PROCEDURE — 700102 HCHG RX REV CODE 250 W/ 637 OVERRIDE(OP): Performed by: INTERNAL MEDICINE

## 2018-07-12 PROCEDURE — A9270 NON-COVERED ITEM OR SERVICE: HCPCS | Performed by: HOSPITALIST

## 2018-07-12 PROCEDURE — 770006 HCHG ROOM/CARE - MED/SURG/GYN SEMI*

## 2018-07-12 PROCEDURE — 160036 HCHG PACU - EA ADDL 30 MINS PHASE I: Performed by: ORTHOPAEDIC SURGERY

## 2018-07-12 PROCEDURE — 501488 HCHG SUCTION CANN, WOUNDVAC TRAC: Performed by: ORTHOPAEDIC SURGERY

## 2018-07-12 PROCEDURE — 500881 HCHG PACK, EXTREMITY: Performed by: ORTHOPAEDIC SURGERY

## 2018-07-12 PROCEDURE — 700105 HCHG RX REV CODE 258: Performed by: HOSPITALIST

## 2018-07-12 PROCEDURE — 700111 HCHG RX REV CODE 636 W/ 250 OVERRIDE (IP): Performed by: INTERNAL MEDICINE

## 2018-07-12 PROCEDURE — 501838 HCHG SUTURE GENERAL: Performed by: ORTHOPAEDIC SURGERY

## 2018-07-12 PROCEDURE — 700111 HCHG RX REV CODE 636 W/ 250 OVERRIDE (IP)

## 2018-07-12 PROCEDURE — 500452 HCHG DRESSING, WOUND VAC MED.: Performed by: ORTHOPAEDIC SURGERY

## 2018-07-12 PROCEDURE — 700102 HCHG RX REV CODE 250 W/ 637 OVERRIDE(OP): Performed by: HOSPITALIST

## 2018-07-12 PROCEDURE — A9270 NON-COVERED ITEM OR SERVICE: HCPCS

## 2018-07-12 PROCEDURE — 700111 HCHG RX REV CODE 636 W/ 250 OVERRIDE (IP): Performed by: HOSPITALIST

## 2018-07-12 PROCEDURE — 500367 HCHG DRAIN KIT, HEMOVAC: Performed by: ORTHOPAEDIC SURGERY

## 2018-07-12 PROCEDURE — 82962 GLUCOSE BLOOD TEST: CPT

## 2018-07-12 PROCEDURE — 80048 BASIC METABOLIC PNL TOTAL CA: CPT

## 2018-07-12 PROCEDURE — L1830 KO IMMOB CANVAS LONG PRE OTS: HCPCS | Performed by: ORTHOPAEDIC SURGERY

## 2018-07-12 PROCEDURE — A6454 SELF-ADHER BAND W>=3" <5"/YD: HCPCS | Performed by: ORTHOPAEDIC SURGERY

## 2018-07-12 PROCEDURE — 160002 HCHG RECOVERY MINUTES (STAT): Performed by: ORTHOPAEDIC SURGERY

## 2018-07-12 PROCEDURE — 0HXKXZZ TRANSFER RIGHT LOWER LEG SKIN, EXTERNAL APPROACH: ICD-10-PCS | Performed by: ORTHOPAEDIC SURGERY

## 2018-07-12 PROCEDURE — 99232 SBSQ HOSP IP/OBS MODERATE 35: CPT | Performed by: INTERNAL MEDICINE

## 2018-07-12 PROCEDURE — 500122 HCHG BOVIE, BLADE: Performed by: ORTHOPAEDIC SURGERY

## 2018-07-12 PROCEDURE — 36415 COLL VENOUS BLD VENIPUNCTURE: CPT

## 2018-07-12 RX ORDER — MEPERIDINE HYDROCHLORIDE 25 MG/ML
INJECTION INTRAMUSCULAR; INTRAVENOUS; SUBCUTANEOUS
Status: COMPLETED
Start: 2018-07-12 | End: 2018-07-12

## 2018-07-12 RX ORDER — OXYCODONE HCL 5 MG/5 ML
SOLUTION, ORAL ORAL
Status: COMPLETED
Start: 2018-07-12 | End: 2018-07-12

## 2018-07-12 RX ORDER — DIPHENHYDRAMINE HYDROCHLORIDE 50 MG/ML
INJECTION INTRAMUSCULAR; INTRAVENOUS
Status: COMPLETED
Start: 2018-07-12 | End: 2018-07-12

## 2018-07-12 RX ADMIN — OXYCODONE HYDROCHLORIDE 10 MG: 5 TABLET ORAL at 18:57

## 2018-07-12 RX ADMIN — HYDROMORPHONE HYDROCHLORIDE 0.5 MG: 10 INJECTION, SOLUTION INTRAMUSCULAR; INTRAVENOUS; SUBCUTANEOUS at 12:57

## 2018-07-12 RX ADMIN — AMPICILLIN SODIUM AND SULBACTAM SODIUM 3 G: 2; 1 INJECTION, POWDER, FOR SOLUTION INTRAMUSCULAR; INTRAVENOUS at 00:51

## 2018-07-12 RX ADMIN — OXYCODONE HYDROCHLORIDE 10 MG: 5 SOLUTION ORAL at 12:56

## 2018-07-12 RX ADMIN — AMPICILLIN SODIUM AND SULBACTAM SODIUM 3 G: 2; 1 INJECTION, POWDER, FOR SOLUTION INTRAMUSCULAR; INTRAVENOUS at 15:32

## 2018-07-12 RX ADMIN — DIPHENHYDRAMINE HYDROCHLORIDE 25 MG: 50 INJECTION INTRAMUSCULAR; INTRAVENOUS at 13:23

## 2018-07-12 RX ADMIN — OXYCODONE HYDROCHLORIDE 10 MG: 5 TABLET ORAL at 15:46

## 2018-07-12 RX ADMIN — HYDROMORPHONE HYDROCHLORIDE 0.5 MG: 10 INJECTION, SOLUTION INTRAMUSCULAR; INTRAVENOUS; SUBCUTANEOUS at 12:26

## 2018-07-12 RX ADMIN — MEPERIDINE HYDROCHLORIDE 12.5 MG: 25 INJECTION INTRAMUSCULAR; INTRAVENOUS; SUBCUTANEOUS at 13:22

## 2018-07-12 RX ADMIN — HYDROMORPHONE HYDROCHLORIDE 0.5 MG: 10 INJECTION, SOLUTION INTRAMUSCULAR; INTRAVENOUS; SUBCUTANEOUS at 12:37

## 2018-07-12 RX ADMIN — AMPICILLIN SODIUM AND SULBACTAM SODIUM 3 G: 2; 1 INJECTION, POWDER, FOR SOLUTION INTRAMUSCULAR; INTRAVENOUS at 21:42

## 2018-07-12 RX ADMIN — FENTANYL CITRATE 50 MCG: 50 INJECTION, SOLUTION INTRAMUSCULAR; INTRAVENOUS at 12:21

## 2018-07-12 RX ADMIN — ENOXAPARIN SODIUM 40 MG: 100 INJECTION SUBCUTANEOUS at 05:49

## 2018-07-12 RX ADMIN — INSULIN HUMAN 3 UNITS: 100 INJECTION, SOLUTION PARENTERAL at 21:45

## 2018-07-12 RX ADMIN — MEPERIDINE HYDROCHLORIDE 12.5 MG: 25 INJECTION INTRAMUSCULAR; INTRAVENOUS; SUBCUTANEOUS at 13:12

## 2018-07-12 RX ADMIN — INSULIN GLARGINE 15 UNITS: 100 INJECTION, SOLUTION SUBCUTANEOUS at 17:39

## 2018-07-12 RX ADMIN — HYDROMORPHONE HYDROCHLORIDE 0.5 MG: 10 INJECTION, SOLUTION INTRAMUSCULAR; INTRAVENOUS; SUBCUTANEOUS at 13:05

## 2018-07-12 RX ADMIN — HYDROMORPHONE HYDROCHLORIDE 0.5 MG: 10 INJECTION, SOLUTION INTRAMUSCULAR; INTRAVENOUS; SUBCUTANEOUS at 13:45

## 2018-07-12 RX ADMIN — AMPICILLIN SODIUM AND SULBACTAM SODIUM 3 G: 2; 1 INJECTION, POWDER, FOR SOLUTION INTRAMUSCULAR; INTRAVENOUS at 05:46

## 2018-07-12 ASSESSMENT — ENCOUNTER SYMPTOMS
SENSORY CHANGE: 0
VOMITING: 0
DIARRHEA: 0
SPEECH CHANGE: 0
TINGLING: 0
PALPITATIONS: 0
PHOTOPHOBIA: 0
DEPRESSION: 0
NECK PAIN: 0
HEARTBURN: 0
COUGH: 0
SPUTUM PRODUCTION: 0
CHILLS: 0
FOCAL WEAKNESS: 0
DIZZINESS: 0
WEAKNESS: 0
FALLS: 0
BLURRED VISION: 0
PND: 0

## 2018-07-12 ASSESSMENT — PAIN SCALES - GENERAL
PAINLEVEL_OUTOF10: ASSUMED PAIN PRESENT
PAINLEVEL_OUTOF10: 0
PAINLEVEL_OUTOF10: ASSUMED PAIN PRESENT
PAINLEVEL_OUTOF10: 8
PAINLEVEL_OUTOF10: 0
PAINLEVEL_OUTOF10: ASSUMED PAIN PRESENT
PAINLEVEL_OUTOF10: 0

## 2018-07-12 ASSESSMENT — LIFESTYLE VARIABLES: SUBSTANCE_ABUSE: 0

## 2018-07-12 NOTE — PROGRESS NOTES
Pt A&OX4, resting calmly in bed. Wound vac in place on right knee.  Pt updated on POC and verbalized understanding. Pt denies any needs. Pt states pain in knee is tolerable and has not required pain meds this shift. Fall precautions in place. Hourly rounding in place.

## 2018-07-12 NOTE — CARE PLAN
Problem: Infection  Goal: Will remain free from infection  Outcome: PROGRESSING AS EXPECTED  Pt scheduled for I&D this AM; periwound skin warm and pink upon assessment.     Problem: Acute Care of the Diabetic Patient  Goal: Optimal Outcome for the Diabetic Patient  Outcome: PROGRESSING AS EXPECTED  Pt has new diagnosis of DM; educated pt on self care, diet, and wound management with diabetes. Pt verbalized understanding but will need more education and reinforcement.

## 2018-07-12 NOTE — PROGRESS NOTES
Pt A&Ox4, no c/o pain at this time. Right knee warm to touch and wound vac set to suction. Pt NPO since midnight for I&D today at 0945. IV running NS [facility]. Pt updated on plan for the day, fall precautions in place, pt encouraged to call for help as needed.

## 2018-07-12 NOTE — CARE PLAN
Problem: Infection  Goal: Will remain free from infection  Standard precautions in place. No s/sx of infection noted. Pt educated on infection prevention and s/sx. Verbalized understanding.     Problem: Pain Management  Goal: Pain level will decrease to patient's comfort goal  Pain assessment completed. Pt denies pain.

## 2018-07-12 NOTE — PROGRESS NOTES
Received report from day shift RN, assumed care. Patient is awake, on bed/A&OX4. Stand by assist, denies pain, due medications given. FSBS 156 3 units of regular insulin administered and lantus 15 units. On TKO at 10cc/hr, tolerating well. Pt on RA, tolerating well. Plan of care is to continue wound care. Pt will be NPO by midnight, CNA made aware. I&D scheduled for tomorrow. Discussed plan of care. Reminded to call for assistance. Personal belongings within reach. Reinforced fall precautions Call light and personal belongings within reach, bed kept low, treaded socks on. Assisted as necessary. Kept rested and comfortable at all times. Hourly rounds in place.

## 2018-07-12 NOTE — OR NURSING
"Patient continues to complain of pain/tightness in right knee; \"It's too tight\"  Medicated with Demerol as ordered.  Patient states that the pain is still very strong; remedicated with Benadryl as ordered.   Patient sleeping at present.  "

## 2018-07-12 NOTE — PROGRESS NOTES
[facility]    Date of Service: 7/12/2018    Chief Complaint  62 y.o. male admitted 7/9/2018 with signs of knee infection.  Patient received surgery I&D.  Infectious disease specialist was consulted.    Interval Problem Update  7/10 patient has been stable overnight, sleepy during the day.  has wound VAC in place. Ortho on. Patient's pain is local, 6-8/10, intermittent and does not radiate to other location, sharp and with some tingling. Can be controlled by pain meds. Dressing in place. Patient otherwise denies fever, chills, nausea, vomiting, adb pain, SOB, CP, headache, constipation, diarrhea, cough, or sputum.  7/11 patient has been stable overnight. Patient's pain is local, 6-8/10, intermittent and does not radiate to other location, sharp and with some tingling. Can be controlled by pain meds. Dressing in place.  Patient otherwise denies fever, chills, nausea, vomiting, adb pain, SOB, CP, headache, constipation, diarrhea, cough, or sputum.  7/12 I/D again today. Patient otherwise denies fever, chills, nausea, vomiting, adb pain, SOB, CP, headache, constipation, diarrhea, cough, or sputum.    Consultants/Specialty  Ortho  Id     Disposition  tbd        Review of Systems   Constitutional: Negative for chills.   HENT: Negative for ear pain and hearing loss.    Eyes: Negative for blurred vision and photophobia.   Respiratory: Negative for cough and sputum production.    Cardiovascular: Negative for chest pain, palpitations, leg swelling and PND.   Gastrointestinal: Negative for diarrhea, heartburn and vomiting.   Genitourinary: Negative for dysuria, frequency and hematuria.   Musculoskeletal: Positive for joint pain. Negative for falls and neck pain.   Skin: Negative for rash.   Neurological: Negative for dizziness, tingling, sensory change, speech change, focal weakness and weakness.   Psychiatric/Behavioral: Negative for depression, substance abuse and suicidal ideas.      Physical Exam   Laboratory/Imaging   Hemodynamics  Temp (24hrs), Av.5 °C (97.7 °F), Min:36.3 °C (97.3 °F), Max:36.8 °C (98.2 °F)   Temperature: 36.3 °C (97.3 °F)  Pulse  Av.5  Min: 71  Max: 90    Blood Pressure: 151/81      Respiratory      Respiration: 18, Pulse Oximetry: 92 %     Work Of Breathing / Effort: (P) Mild  RUL Breath Sounds: (P) Clear, RML Breath Sounds: (P) Clear, RLL Breath Sounds: (P) Diminished, FIDELINA Breath Sounds: (P) Clear, LLL Breath Sounds: (P) Diminished    Fluids    Intake/Output Summary (Last 24 hours) at 18 0941  Last data filed at 18 0800   Gross per 24 hour   Intake             2200 ml   Output             3300 ml   Net            -1100 ml       Nutrition  Orders Placed This Encounter   Procedures   • DIET NPO     Standing Status:   Standing     Number of Occurrences:   8     Order Specific Question:   Restrict to:     Answer:   Sips with Medications [3]     Physical Exam   Constitutional: He is oriented to person, place, and time. He appears well-developed. No distress.   HENT:   Head: Normocephalic.   Eyes: Left eye exhibits no discharge.   Neck: Normal range of motion. Neck supple. No JVD present. No thyromegaly present.   Cardiovascular: Normal rate, regular rhythm and normal heart sounds.  Exam reveals no gallop.    No murmur heard.  Pulmonary/Chest: Effort normal and breath sounds normal. No respiratory distress. He has no wheezes. He has no rales.   Abdominal: Soft. Bowel sounds are normal. He exhibits no distension and no mass. There is no tenderness.   Musculoskeletal: He exhibits no edema.   Right knee tender to touch, warm to touch, possible infection, status post surgery.   Lymphadenopathy:     He has no cervical adenopathy.   Neurological: He is alert and oriented to person, place, and time. He displays normal reflexes. No cranial nerve deficit.   Skin: Skin is dry. No rash noted.   Psychiatric: He has a normal mood and affect.   Nursing note and vitals reviewed.       Recent Labs      07/10/18   0228  07/11/18   0306  07/12/18   0411   WBC  5.4  5.0  5.5   RBC  4.55*  4.48*  4.69*   HEMOGLOBIN  13.6*  13.5*  14.1   HEMATOCRIT  41.9*  41.8*  43.7   MCV  92.1  93.3  93.2   MCH  29.9  30.1  30.1   MCHC  32.5*  32.3*  32.3*   RDW  44.4  45.1  45.1   PLATELETCT  220  241  278   MPV  10.3  10.4  10.1     Recent Labs      07/10/18   0228  07/11/18   0306  07/12/18   0411   SODIUM  132*  137  137   POTASSIUM  4.7  4.0  4.0   CHLORIDE  101  104  103   CO2  22  24  25   GLUCOSE  249*  222*  176*   BUN  20  16  14   CREATININE  0.85  0.84  0.88   CALCIUM  8.6  8.9  8.9     Recent Labs      07/09/18   1558   INR  1.06                  Assessment/Plan     * Infection of right knee (HCC)- (present on admission)   Assessment & Plan    s/p I/D 7/9 and 7/12  Ortho on  Culture pending  ID on  Close monitor  Cont iv abx        Hyponatremia- (present on admission)   Assessment & Plan    Resolved Possible from hyperglycemia  Also concern of dehydration  IV fluid rehydration finished        Type 2 diabetes mellitus, without long-term current use of insulin ([facility])- (present on admission)   Assessment & Plan    a1c 11.93  DM diet  Lantus 15iu with ISS to cover  Monitor BS  Patient has type 2 diabetes without long-term use of insulin without complication  Patient's blood sugar not controlled with hyperglycemia but better        Hyperglycemia- (present on admission)   Assessment & Plan    Glucose was 223 in the ER at [place]  Sliding scale insulin ordered  HbA1c 11.9, treat as abov for DM 2          Quality-Core Measures   Reviewed items::  Labs reviewed, Medications reviewed and Radiology images reviewed  Vivas catheter::  No Vivas  DVT prophylaxis pharmacological::  Enoxaparin (Lovenox)  DVT prophylaxis - mechanical:  SCDs  Antibiotics:  Treating active infection/contamination beyond 24 hours perioperative coverage   For complexity-based billing, please refer to the history, exam, and decison making  above. In addition, I spent >35 minutes caring for the patient today. More than 50% of the time was spent counseling and coordinating care.    I have discussed with RN and CM and SW and other consultants about patient's plan.

## 2018-07-12 NOTE — OR NURSING
"Patient states the pain is a \"little better, but still hurts like a sob\".  Drowsy, arouses easily.  "

## 2018-07-12 NOTE — OR NURSING
7308  Patient sleeping; arouses to verbal stimuli; continues to complain of large amount of pain in right knee.  States he feels the dressing is too tight   Knee immobilizer opened and dressing intact.  Dentures taken out of mouth by patient; offered pink cup; declined by patient.  Cup left on bed for future use.

## 2018-07-12 NOTE — OR SURGEON
Immediate Post OP Note    PreOp Diagnosis: prior infected pre patella bursitis with 1 inch full thickness defect over patella    PostOp Diagnosis: same    Procedure(s):  IRRIGATION & DEBRIDEMENT ORTHO-wound vac placement - Wound Class: Dirty or Infected  Primary closure with rotational flaps around the knee  Application of incisional vac to primary closure    Surgeon(s):  SARA Arriaga M.D. surgeon    Anesthesiologist/Type of Anesthesia:  Anesthesiologist: Wale Lema M.D./General    Surgical Staff:  Circulator: Edel Todd R.N.; Naa Lobo R.N.; Theresa Miramontes R.N.  Scrub Person: Kim Davalos  [facility] Scrub: Yolette Crawford    Specimens removed if any:  * No specimens in log *    Estimated Blood Loss: minimal    Findings: as described    Complications: none        7/12/2018 12:03 PM Riley Justin M.D.

## 2018-07-12 NOTE — PROGRESS NOTES
Preoperative Visit  RBA all explained of ID, rotational flap to cover wound and reapolication of vac  Right, answered all questions  Justin

## 2018-07-12 NOTE — OP REPORT
DATE OF SERVICE:  07/12/2018    PREOPERATIVE DIAGNOSIS:  Anterior prepatellar circular defect of 1 inch   full-thickness to patellar fascia.    POSTOPERATIVE DIAGNOSIS:  Anterior prepatellar circular defect of 1 inch   full-thickness to patellar fascia.    OPERATION PERFORMED:  1.  Rotational flap coverage.  2.  Application of wound VAC.    SURGEON:  Riley Justin MD    ASSISTANT:  Unassisted.    INDICATIONS FOR THE OPERATION:  A circular full-thickness eschar that was   debrided several weeks ago, now presents for closure and irrigation and   debridement.  Risks, benefits, and alternatives were all explained.  Patient   consented for full surgical undertaking.  I answered all of his questions.    COMPLICATIONS:  None.    TOURNIQUET:  No tourniquet.    OPERATION:  The patient was brought to the operative room awake, alert, placed   on the operating room table in supine position.  The right lower extremity   was then shaved, scrubbed, prepped, and draped in a normal sterile routine   fashion.  Time-out and the operation began.  The wound VAC was removed.  The   interface was clean and pink; however, there still was some discharge.  We   performed a circular incision from the medial aspect of the tibia to the mid   thigh for an anterior medial thigh flap.  We were able to rotate that into the   defect.  We also made a second incision along the lateral aspect of the tibia   to mobilize the anterior lateral tibia to close the defect.  This wound was   now copiously irrigated, debrided, and closed with PDS suture and then an   application of an incisional VAC.  The patient was taken to the recovery room   in stable condition.  No intraoperative or immediate postop complications.       ____________________________________     MD XAVEIR STERN / RYANNE    DD:  07/12/2018 12:00:39  DT:  07/12/2018 12:29:11    D#:  [id number]  Job#:  [id number]

## 2018-07-13 LAB
ANION GAP SERPL CALC-SCNC: 8 MMOL/L (ref 0–11.9)
BASOPHILS # BLD AUTO: 0.7 % (ref 0–1.8)
BASOPHILS # BLD: 0.04 K/UL (ref 0–0.12)
BUN SERPL-MCNC: 18 MG/DL (ref 8–22)
CALCIUM SERPL-MCNC: 8.8 MG/DL (ref 8.5–10.5)
CHLORIDE SERPL-SCNC: 104 MMOL/L (ref 96–112)
CO2 SERPL-SCNC: 25 MMOL/L (ref 20–33)
CREAT SERPL-MCNC: 0.93 MG/DL (ref 0.5–1.4)
EOSINOPHIL # BLD AUTO: 0.12 K/UL (ref 0–0.51)
EOSINOPHIL NFR BLD: 2 % (ref 0–6.9)
ERYTHROCYTE [DISTWIDTH] IN BLOOD BY AUTOMATED COUNT: 46.1 FL (ref 35.9–50)
GLUCOSE BLD-MCNC: 138 MG/DL (ref 65–99)
GLUCOSE BLD-MCNC: 172 MG/DL (ref 65–99)
GLUCOSE BLD-MCNC: 180 MG/DL (ref 65–99)
GLUCOSE BLD-MCNC: 224 MG/DL (ref 65–99)
GLUCOSE SERPL-MCNC: 158 MG/DL (ref 65–99)
HCT VFR BLD AUTO: 40.6 % (ref 42–52)
HGB BLD-MCNC: 13.1 G/DL (ref 14–18)
IMM GRANULOCYTES # BLD AUTO: 0.1 K/UL (ref 0–0.11)
IMM GRANULOCYTES NFR BLD AUTO: 1.6 % (ref 0–0.9)
LYMPHOCYTES # BLD AUTO: 1.42 K/UL (ref 1–4.8)
LYMPHOCYTES NFR BLD: 23.2 % (ref 22–41)
MCH RBC QN AUTO: 30.2 PG (ref 27–33)
MCHC RBC AUTO-ENTMCNC: 32.3 G/DL (ref 33.7–35.3)
MCV RBC AUTO: 93.5 FL (ref 81.4–97.8)
MONOCYTES # BLD AUTO: 0.75 K/UL (ref 0–0.85)
MONOCYTES NFR BLD AUTO: 12.3 % (ref 0–13.4)
NEUTROPHILS # BLD AUTO: 3.68 K/UL (ref 1.82–7.42)
NEUTROPHILS NFR BLD: 60.2 % (ref 44–72)
NRBC # BLD AUTO: 0 K/UL
NRBC BLD-RTO: 0 /100 WBC
PLATELET # BLD AUTO: 276 K/UL (ref 164–446)
PMV BLD AUTO: 9.9 FL (ref 9–12.9)
POTASSIUM SERPL-SCNC: 3.9 MMOL/L (ref 3.6–5.5)
RBC # BLD AUTO: 4.34 M/UL (ref 4.7–6.1)
SODIUM SERPL-SCNC: 137 MMOL/L (ref 135–145)
WBC # BLD AUTO: 6.1 K/UL (ref 4.8–10.8)

## 2018-07-13 PROCEDURE — 36415 COLL VENOUS BLD VENIPUNCTURE: CPT

## 2018-07-13 PROCEDURE — 99232 SBSQ HOSP IP/OBS MODERATE 35: CPT | Performed by: INTERNAL MEDICINE

## 2018-07-13 PROCEDURE — 80048 BASIC METABOLIC PNL TOTAL CA: CPT

## 2018-07-13 PROCEDURE — 97165 OT EVAL LOW COMPLEX 30 MIN: CPT

## 2018-07-13 PROCEDURE — 82962 GLUCOSE BLOOD TEST: CPT | Mod: 91

## 2018-07-13 PROCEDURE — G8989 SELF CARE D/C STATUS: HCPCS | Mod: CI

## 2018-07-13 PROCEDURE — A9270 NON-COVERED ITEM OR SERVICE: HCPCS | Performed by: HOSPITALIST

## 2018-07-13 PROCEDURE — G8988 SELF CARE GOAL STATUS: HCPCS | Mod: CI

## 2018-07-13 PROCEDURE — G8979 MOBILITY GOAL STATUS: HCPCS | Mod: CI

## 2018-07-13 PROCEDURE — 700102 HCHG RX REV CODE 250 W/ 637 OVERRIDE(OP): Performed by: INTERNAL MEDICINE

## 2018-07-13 PROCEDURE — 700102 HCHG RX REV CODE 250 W/ 637 OVERRIDE(OP): Performed by: HOSPITALIST

## 2018-07-13 PROCEDURE — 700111 HCHG RX REV CODE 636 W/ 250 OVERRIDE (IP): Performed by: HOSPITALIST

## 2018-07-13 PROCEDURE — G8978 MOBILITY CURRENT STATUS: HCPCS | Mod: CI

## 2018-07-13 PROCEDURE — G8987 SELF CARE CURRENT STATUS: HCPCS | Mod: CI

## 2018-07-13 PROCEDURE — 700111 HCHG RX REV CODE 636 W/ 250 OVERRIDE (IP): Performed by: INTERNAL MEDICINE

## 2018-07-13 PROCEDURE — 85025 COMPLETE CBC W/AUTO DIFF WBC: CPT

## 2018-07-13 PROCEDURE — 97162 PT EVAL MOD COMPLEX 30 MIN: CPT

## 2018-07-13 PROCEDURE — 770006 HCHG ROOM/CARE - MED/SURG/GYN SEMI*

## 2018-07-13 PROCEDURE — 700105 HCHG RX REV CODE 258: Performed by: HOSPITALIST

## 2018-07-13 RX ORDER — CEFAZOLIN SODIUM 2 G/100ML
2 INJECTION, SOLUTION INTRAVENOUS EVERY 8 HOURS
Status: DISCONTINUED | OUTPATIENT
Start: 2018-07-13 | End: 2018-07-17

## 2018-07-13 RX ADMIN — OXYCODONE HYDROCHLORIDE 10 MG: 5 TABLET ORAL at 17:51

## 2018-07-13 RX ADMIN — CEFAZOLIN SODIUM 2 G: 2 INJECTION, SOLUTION INTRAVENOUS at 22:18

## 2018-07-13 RX ADMIN — CEFAZOLIN SODIUM 2 G: 2 INJECTION, SOLUTION INTRAVENOUS at 14:28

## 2018-07-13 RX ADMIN — AMPICILLIN SODIUM AND SULBACTAM SODIUM 3 G: 2; 1 INJECTION, POWDER, FOR SOLUTION INTRAMUSCULAR; INTRAVENOUS at 03:10

## 2018-07-13 RX ADMIN — INSULIN HUMAN 3 UNITS: 100 INJECTION, SOLUTION PARENTERAL at 21:29

## 2018-07-13 RX ADMIN — INSULIN HUMAN 3 UNITS: 100 INJECTION, SOLUTION PARENTERAL at 17:58

## 2018-07-13 RX ADMIN — AMPICILLIN SODIUM AND SULBACTAM SODIUM 3 G: 2; 1 INJECTION, POWDER, FOR SOLUTION INTRAMUSCULAR; INTRAVENOUS at 08:54

## 2018-07-13 RX ADMIN — INSULIN GLARGINE 15 UNITS: 100 INJECTION, SOLUTION SUBCUTANEOUS at 17:58

## 2018-07-13 RX ADMIN — ENOXAPARIN SODIUM 40 MG: 100 INJECTION SUBCUTANEOUS at 05:07

## 2018-07-13 RX ADMIN — OXYCODONE HYDROCHLORIDE 5 MG: 5 TABLET ORAL at 13:00

## 2018-07-13 RX ADMIN — OXYCODONE HYDROCHLORIDE 10 MG: 5 TABLET ORAL at 22:25

## 2018-07-13 RX ADMIN — STANDARDIZED SENNA CONCENTRATE AND DOCUSATE SODIUM 2 TABLET: 8.6; 5 TABLET, FILM COATED ORAL at 05:07

## 2018-07-13 RX ADMIN — MORPHINE SULFATE 2 MG: 4 INJECTION INTRAVENOUS at 05:13

## 2018-07-13 RX ADMIN — INSULIN HUMAN 4 UNITS: 100 INJECTION, SOLUTION PARENTERAL at 13:06

## 2018-07-13 RX ADMIN — OXYCODONE HYDROCHLORIDE 5 MG: 5 TABLET ORAL at 03:55

## 2018-07-13 RX ADMIN — OXYCODONE HYDROCHLORIDE 10 MG: 5 TABLET ORAL at 08:54

## 2018-07-13 ASSESSMENT — LIFESTYLE VARIABLES: SUBSTANCE_ABUSE: 0

## 2018-07-13 ASSESSMENT — COGNITIVE AND FUNCTIONAL STATUS - GENERAL
SUGGESTED CMS G CODE MODIFIER MOBILITY: CK
SUGGESTED CMS G CODE MODIFIER DAILY ACTIVITY: CJ
HELP NEEDED FOR BATHING: A LITTLE
TOILETING: A LITTLE
MOVING TO AND FROM BED TO CHAIR: A LITTLE
CLIMB 3 TO 5 STEPS WITH RAILING: A LITTLE
TURNING FROM BACK TO SIDE WHILE IN FLAT BAD: A LITTLE
DRESSING REGULAR LOWER BODY CLOTHING: A LITTLE
MOBILITY SCORE: 19
DAILY ACTIVITIY SCORE: 21
MOVING FROM LYING ON BACK TO SITTING ON SIDE OF FLAT BED: A LITTLE
STANDING UP FROM CHAIR USING ARMS: A LITTLE

## 2018-07-13 ASSESSMENT — ENCOUNTER SYMPTOMS
WEIGHT LOSS: 0
DIAPHORESIS: 0
SENSORY CHANGE: 0
VOMITING: 0
BLURRED VISION: 0
FALLS: 0
NAUSEA: 0
DIZZINESS: 0
SPUTUM PRODUCTION: 0
DIARRHEA: 0
NECK PAIN: 0
EYE PAIN: 0
DEPRESSION: 0
COUGH: 0
FOCAL WEAKNESS: 0
SPEECH CHANGE: 0
FEVER: 0
TINGLING: 0
CLAUDICATION: 0
ABDOMINAL PAIN: 0
SHORTNESS OF BREATH: 1
HEMOPTYSIS: 0
EYE DISCHARGE: 0

## 2018-07-13 ASSESSMENT — ACTIVITIES OF DAILY LIVING (ADL): TOILETING: INDEPENDENT

## 2018-07-13 ASSESSMENT — GAIT ASSESSMENTS
DISTANCE (FEET): 200
GAIT LEVEL OF ASSIST: STAND BY ASSIST
DEVIATION: ANTALGIC

## 2018-07-13 ASSESSMENT — PAIN SCALES - GENERAL
PAINLEVEL_OUTOF10: 7
PAINLEVEL_OUTOF10: 4
PAINLEVEL_OUTOF10: 4
PAINLEVEL_OUTOF10: 0
PAINLEVEL_OUTOF10: 5

## 2018-07-13 NOTE — THERAPY
"Occupational Therapy Evaluation completed.   Functional Status:  Independent to supervision ADLs and mobility  Plan of Care: Patient with no further skilled OT needs in the acute care setting at this time  Discharge Recommendations:  Equipment: Tub Transfer Bench. Post-acute therapy Currently anticipate no further skilled therapy needs once patient is discharged from the inpatient setting.    OT eval completed. Pt is a 61 y/o male admitted with R knee pain and swelling. R pre-patellar septic bursitis s/p R pre-patella bursectomy with I&D on 7/9 and R rotational flap coverage of wound, I&D, and wound vac placement on 7/12. Pt up with PT pre-session. Pt desaturated to 86/87% on room air with activity. Pt is currently Independent to set-up for sitting ADLs. Supervision for standing ADls. Pt able to tolerate mobilization without AD this AM. R knee immobilizer and wound vac in place. Pt able to tami and doff socks in sitting with supervision. Pt reports that he has friends that can assist at discharge. Recommend tub transfer bench if pt experiences difficulty with tub/shower transfer upon return to home - pt aware. No further skilled OT indicated in acute care. Recommend return to home at D/C.    See \"Rehab Therapy-Acute\" Patient Summary Report for complete documentation.    "

## 2018-07-13 NOTE — PROGRESS NOTES
Seen & examined  Pain controlled  Bored being in the hospital    Vitals:    07/12/18 1805 07/12/18 1905 07/13/18 0330 07/13/18 0705   BP: 121/68 134/79 119/68 135/81   Pulse: 70 67 72 68   Resp: 18 16 16 16   Temp: 36.2 °C (97.1 °F) 36.5 °C (97.7 °F) 36.1 °C (97 °F) 36.7 °C (98.1 °F)   SpO2: 94% 96% 92% 97%   Weight:       Height:         RLE:  Wound vac intact to suction  hemovac 3 cc recorded since OR  Knee immobilizer in place  f/e ankle, toes  Foot w/w/p    POD#3 s/p R prepatella bursectomy, I&D.  Dry aspiration of joint, no TKA involvement at this time.  POD#1 s/p repeat I&D, wound closure    - OR cultures - +Strep agalactiae, Staph  - WBAT in knee immobilizer at all times  - IV antibiotics per ID recommendations  - will plan to D/C hemovac drain and remove incisional wound vac on Sunday.  If wound ok, can D/C home Monday per orthopaedic perspective

## 2018-07-13 NOTE — PROGRESS NOTES
[facility] Hospitalist Progress Note    Date of Service: 7/13/2018    Chief Complaint  62 y.o. male admitted 7/9/2018 with signs of knee infection.  Patient received surgery I&D.  Infectious disease specialist was consulted.    Interval Problem Update  7/10 patient has been stable overnight, sleepy during the day.  has wound VAC in place. Ortho on. Patient's pain is local, 6-8/10, intermittent and does not radiate to other location, sharp and with some tingling. Can be controlled by pain meds. Dressing in place. Patient otherwise denies fever, chills, nausea, vomiting, adb pain, SOB, CP, headache, constipation, diarrhea, cough, or sputum.  7/11 patient has been stable overnight. Patient's pain is local, 6-8/10, intermittent and does not radiate to other location, sharp and with some tingling. Can be controlled by pain meds. Dressing in place.  Patient otherwise denies fever, chills, nausea, vomiting, adb pain, SOB, CP, headache, constipation, diarrhea, cough, or sputum.  7/12 I/D again today. Patient otherwise denies fever, chills, nausea, vomiting, adb pain, SOB, CP, headache, constipation, diarrhea, cough, or sputum.  7/13 patient had been stable overnight, tolerated procedure yesterday, patient complained of knee tenderness. Patient's pain is local, 6-8/10, intermittent and does not radiate to other location, sharp and with some tingling. Can be controlled by pain meds. Dressing in place.  Patient wants to go home.  Infectious disease specialist recommend total of 4 weeks of at least antibiotics.Patient otherwise denies fever, chills, nausea, vomiting, adb pain, SOB, CP, headache, constipation, diarrhea, cough, or sputum.    Consultants/Specialty  Ortho  Id     Disposition  tbd        Review of Systems   Constitutional: Negative for diaphoresis, fever and weight loss.   HENT: Negative for ear pain and tinnitus.    Eyes: Negative for blurred vision, pain and discharge.   Respiratory: Negative for hemoptysis and sputum  production.    Cardiovascular: Negative for chest pain and claudication.   Gastrointestinal: Negative for diarrhea, nausea and vomiting.   Genitourinary: Negative for dysuria, frequency and hematuria.   Musculoskeletal: Positive for joint pain. Negative for falls and neck pain.   Skin: Negative for rash.   Neurological: Negative for dizziness, tingling, sensory change, speech change and focal weakness.   Psychiatric/Behavioral: Negative for depression, substance abuse and suicidal ideas.      Physical Exam  Laboratory/Imaging   Hemodynamics  Temp (24hrs), Av.3 °C (97.4 °F), Min:36.1 °C (97 °F), Max:36.7 °C (98.1 °F)   Temperature: 36.7 °C (98.1 °F)  Pulse  Av.4  Min: 59  Max: 98 Heart Rate (Monitored): 64  Blood Pressure: 135/81 (nurse aware), NIBP: 120/65      Respiratory      Respiration: 16, Pulse Oximetry: 97 %     Work Of Breathing / Effort: Mild  RML Breath Sounds: Diminished, RLL Breath Sounds: Diminished, LLL Breath Sounds: Diminished    Fluids    Intake/Output Summary (Last 24 hours) at 18 0908  Last data filed at 18 0705   Gross per 24 hour   Intake             2480 ml   Output             1403 ml   Net             1077 ml       Nutrition  Orders Placed This Encounter   Procedures   • Diet Order Diabetic     Standing Status:   Standing     Number of Occurrences:   1     Order Specific Question:   Diet:     Answer:   Diabetic [3]     Physical Exam   Constitutional: He is oriented to person, place, and time. He appears well-developed and well-nourished.   HENT:   Head: Normocephalic.   Eyes: Conjunctivae are normal. Left eye exhibits no discharge. No scleral icterus.   Neck: Normal range of motion. Neck supple. No JVD present. No thyromegaly present.   Cardiovascular: Normal rate, regular rhythm and normal heart sounds.  Exam reveals no gallop and no friction rub.    Pulmonary/Chest: Effort normal and breath sounds normal. No respiratory distress. He has no rales. He exhibits no  tenderness.   Abdominal: Soft. Bowel sounds are normal. He exhibits no distension and no mass. There is no rebound.   Musculoskeletal: He exhibits no edema.   Right knee tender to touch, warm to touch, possible infection, status post surgery.   Lymphadenopathy:     He has no cervical adenopathy.   Neurological: He is alert and oriented to person, place, and time. He displays normal reflexes. No cranial nerve deficit.   Skin: Skin is dry. No rash noted.   Psychiatric: He has a normal mood and affect.   Nursing note and vitals reviewed.      Recent Labs      07/11/18   0306  07/12/18 0411 07/13/18   0326   WBC  5.0  5.5  6.1   RBC  4.48*  4.69*  4.34*   HEMOGLOBIN  13.5*  14.1  13.1*   HEMATOCRIT  41.8*  43.7  40.6*   MCV  93.3  93.2  93.5   MCH  30.1  30.1  30.2   MCHC  32.3*  32.3*  32.3*   RDW  45.1  45.1  46.1   PLATELETCT  241  278  276   MPV  10.4  10.1  9.9     Recent Labs      07/11/18   0306  07/12/18 0411 07/13/18   0326   SODIUM  137  137  137   POTASSIUM  4.0  4.0  3.9   CHLORIDE  104  103  104   CO2  24  25  25   GLUCOSE  222*  176*  158*   BUN  16  14  18   CREATININE  0.84  0.88  0.93   CALCIUM  8.9  8.9  8.8                      Assessment/Plan     * Infection of right knee (HCC)- (present on admission)   Assessment & Plan    s/p I/D 7/9 and 7/12  Ortho on  Culture pending  ID on, orderPICC line.  Close monitor  Cont iv abx        Hyponatremia- (present on admission)   Assessment & Plan    Resolved Possible from hyperglycemia  Also concern of dehydration  IV fluid rehydration finished        Type 2 diabetes mellitus, without long-term current use of insulin (HCC)- (present on admission)   Assessment & Plan    a1c 11.93  DM diet  Lantus 15iu with ISS to cover  Monitor BS  Patient has type 2 diabetes without long-term use of insulin without complication  Patient's blood sugar not controlled with hyperglycemia        Hyperglycemia- (present on admission)   Assessment & Plan    Glucose was 223 in the  ER at [place]  Sliding scale insulin ordered  HbA1c 11.9, treat as abov for DM 2          Quality-Core Measures   Reviewed items::  Labs reviewed, Medications reviewed and Radiology images reviewed  Vivas catheter::  No Vivas  DVT prophylaxis pharmacological::  Enoxaparin (Lovenox)  DVT prophylaxis - mechanical:  SCDs  Antibiotics:  Treating active infection/contamination beyond 24 hours perioperative coverage   For complexity-based billing, please refer to the history, exam, and decison making above. In addition, I spent >35 minutes caring for the patient today. More than 50% of the time was spent counseling and coordinating care.    I have discussed with RN and CM and SW and other consultants about patient's plan.

## 2018-07-13 NOTE — THERAPY
"Physical Therapy Evaluation completed.   Bed Mobility:  Supine to Sit: Supervised  Transfers: Sit to Stand: Supervised (with FWW)  Gait: Level Of Assist: Stand by Assist with No Equipment Needed       Plan of Care: Will benefit from Physical Therapy 2 times per week  Discharge Recommendations: Equipment: Will Continue to Assess for Equipment Needs probable no equipment needed other than bathroom adaptive equipment per OT note.       Pt presents with impaired right knee ROM s/p I and D of right pre patellar bursa, now in knee immobilizer and requiring wound vac. Pt is most limited by knee immobilizer, causing necessary hip hike and greater than normal trunk swing, however in current condition pt is able to demonstrate functional mobility required to return home at this time, pending stair assessment (only 3 but has no railing). Will conitnue to follow and update recommendations as medical plan of care is completed. Of note, pt 86% on RA attempts and positive 'talk test' reapplied 2L for 94% recovery.         See \"Rehab Therapy-Acute\" Patient Summary Report for complete documentation.     "

## 2018-07-13 NOTE — CARE PLAN
Problem: Respiratory:  Goal: Respiratory status will improve    Intervention: Assess and monitor pulmonary status  Pt requiring supplemental oxygen, on 2L O2, tolerating well. RML, RLL, LLL diminished.  in place, stating >90%.       Problem: Pain Management  Goal: Pain level will decrease to patient's comfort goal    Intervention: Follow pain managment plan developed in collaboration with patient and Interdisciplinary Team  Pt denies pain at this time, pain is exacerbated with movement of RLE or tightening of immobilizer. Pt has PRN pain meds available to help with pain control.

## 2018-07-13 NOTE — PROGRESS NOTES
Infectious Disease Progress Note    Author: Stephanie Best M.D. Date & Time of service: 2018  10:41 AM    Chief Complaint:  Right knee infection    Interval History:  2018 T-max 98.2 WBC 5 platelets 241 feels slightly better  -T-max 98.1 patient had a rotational flap yesterday.  Complains of pain in the knee.  Otherwise doing well.  Had slight shortness of breath.  Labs Reviewed, Medications Reviewed, Radiology Reviewed and Wound Reviewed.    Review of Systems:  Review of Systems   Constitutional: Positive for malaise/fatigue. Negative for fever.   Respiratory: Positive for shortness of breath. Negative for cough.         Improving   Cardiovascular: Negative for chest pain.   Gastrointestinal: Negative for abdominal pain, nausea and vomiting.   Genitourinary: Negative for dysuria.   Musculoskeletal: Positive for joint pain.        Right knee hurts   Neurological: Negative for sensory change and speech change.       Hemodynamics:  Temp (24hrs), Av.3 °C (97.4 °F), Min:36.1 °C (97 °F), Max:36.7 °C (98.1 °F)  Temperature: 36.7 °C (98.1 °F)  Pulse  Av.4  Min: 59  Max: 98Heart Rate (Monitored): 64  Blood Pressure: 135/81 (nurse aware), NIBP: 120/65       Physical Exam:  Physical Exam   Constitutional: He is oriented to person, place, and time. No distress.   HENT:   Mouth/Throat: No oropharyngeal exudate.   Eyes: No scleral icterus.   Neck: Neck supple.   Cardiovascular: Regular rhythm.    No murmur heard.  Pulmonary/Chest: He has no wheezes. He has no rales.   Abdominal: Soft. There is no tenderness.   Musculoskeletal: He exhibits edema.   Right knee bandaged   Neurological: He is alert and oriented to person, place, and time. No cranial nerve deficit. Coordination normal.   Vitals reviewed.      Meds:    Current Facility-Administered Medications:   •  insulin glargine  •  enoxaparin (LOVENOX) injection  •  senna-docusate **AND** polyethylene glycol/lytes **AND** magnesium hydroxide **AND**  bisacodyl  •  ondansetron  •  ondansetron  •  promethazine  •  promethazine  •  prochlorperazine  •  insulin regular **AND** Accu-Chek ACHS **AND** NOTIFY MD and PharmD **AND** glucose 4 g **AND** dextrose 50%  •  acetaminophen  •  ampicillin-sulbactam (UNASYN) IV  •  oxyCODONE immediate-release  •  morphine injection    Labs:  Recent Labs      07/11/18   0306  07/12/18   0411  07/13/18   0326   WBC  5.0  5.5  6.1   RBC  4.48*  4.69*  4.34*   HEMOGLOBIN  13.5*  14.1  13.1*   HEMATOCRIT  41.8*  43.7  40.6*   MCV  93.3  93.2  93.5   MCH  30.1  30.1  30.2   RDW  45.1  45.1  46.1   PLATELETCT  241  278  276   MPV  10.4  10.1  9.9   NEUTSPOLYS  51.90  52.50  60.20   LYMPHOCYTES  33.80  31.50  23.20   MONOCYTES  9.70  10.90  12.30   EOSINOPHILS  2.40  2.40  2.00   BASOPHILS  0.80  0.90  0.70     Recent Labs      07/11/18   0306  07/12/18   0411  07/13/18   0326   SODIUM  137  137  137   POTASSIUM  4.0  4.0  3.9   CHLORIDE  104  103  104   CO2  24  25  25   GLUCOSE  222*  176*  158*   BUN  16  14  18     Recent Labs      07/11/18   0306  07/12/18   0411  07/13/18   0326   CREATININE  0.84  0.88  0.93       Imaging:  Dx-knee 2- Right    Result Date: 7/9/2018 7/9/2018 4:35 PM HISTORY/REASON FOR EXAM: Anterior knee pain without injury. TECHNIQUE/EXAM DESCRIPTION AND NUMBER OF VIEWS:  2 views of the right knee. COMPARISON: None FINDINGS: Anterior to the patella there is soft tissue swelling and gas. Subjacent edema Total knee arthroplasty with patellar resurfacing has been performed.  The tibial component is cemented. There is also cement anterior to the tibial tubercle/proximal tibia No periprosthetic fracture is detected. There is question of lucency underlying the medial margin of the tibial component Ossification seen superomedial to the femur is likely post traumatic     Prepatellar soft tissue swelling and gas is compatible with infection and/or laceration. No gross intra-articular extension is seen Total knee  arthroplasty. Lucency underlying the medial margin of the tibia could be chronic or secondary to loosening. Comparison with prior x-rays would prove helpful       Micro:  Results     Procedure Component Value Units Date/Time    CULTURE TISSUE W/ GRM STAIN [[id number]]  (Abnormal) Collected:  07/09/18 1956    Order Status:  Completed Specimen:  Tissue Updated:  07/12/18 1455     Significant Indicator POS (POS)     Source TISS     Site Right Knee Tissue     Tissue Culture -- (A)     Gram Stain Result Few WBCs.  No organisms seen.       Tissue Culture Streptococcus agalactiae (Group B)  Moderate growth   (A)    ANAEROBIC CULTURE [[id number]] Collected:  07/09/18 1956    Order Status:  Completed Specimen:  Tissue Updated:  07/12/18 1455     Significant Indicator NEG     Source TISS     Site Right Knee Tissue     Anaerobic Culture, Culture Res No Anaerobes isolated.    AFB CULTURE [[id number]] Collected:  07/09/18 1956    Order Status:  Completed Specimen:  Tissue Updated:  07/12/18 1455     Significant Indicator NEG     Source TISS     Site Right Knee Tissue     AFB Culture Culture in progress.     AFB Smear Results No acid fast bacilli seen.    FUNGAL CULTURE [[id number]] Collected:  07/09/18 1956    Order Status:  Completed Specimen:  Tissue Updated:  07/12/18 1455     Significant Indicator NEG     Source TISS     Site Right Knee Tissue     Fungal Culture Culture in progress.    CULTURE WOUND W/ GRAM STAIN [[id number]]  (Abnormal)  (Susceptibility) Collected:  07/09/18 1550    Order Status:  Completed Specimen:  Wound Updated:  07/12/18 0841     Significant Indicator POS (POS)     Source WND     Site Right Leg     Culture Result Wound -- (A)     Gram Stain Result Many WBCs.  Rare Gram positive cocci.       Culture Result Wound Streptococcus agalactiae (Group B)  Moderate growth   (A)      Staphylococcus aureus  Light growth   (A)    Culture & Susceptibility     STAPHYLOCOCCUS AUREUS     Antibiotic Sensitivity Microscan  Unit Status    Ampicillin/sulbactam Resistant <=8/4 mcg/mL Final    Method: SENSITIVITY, ANTOINETTE    Clindamycin Sensitive <=0.5 mcg/mL Final    Method: SENSITIVITY, ANTOINETTE    Daptomycin Sensitive 1 mcg/mL Final    Method: SENSITIVITY, ANTOINETTE    Erythromycin Resistant >4 mcg/mL Final    Method: SENSITIVITY, ANTOINETTE    Moxifloxacin Sensitive 1 mcg/mL Final    Method: SENSITIVITY, ANTOINETTE    Oxacillin Resistant 2 mcg/mL Final    Method: SENSITIVITY, ANTOINETTE    Tetracycline Sensitive <=4 mcg/mL Final    Method: SENSITIVITY, ANTOINETTE    Trimeth/Sulfa Sensitive <=0.5/9.5 mcg/mL Final    Method: SENSITIVITY, ANTOINETTE    Vancomycin Sensitive 1 mcg/mL Final    Method: SENSITIVITY, ANTOINETTE                       GRAM STAIN [[id number]] Collected:  07/09/18 1956    Order Status:  Completed Specimen:  Tissue Updated:  07/10/18 2017     Significant Indicator .     Source TISS     Site Right Knee Tissue     Gram Stain Result Few WBCs.  No organisms seen.      ACID FAST STAIN [[id number]] Collected:  07/09/18 1956    Order Status:  Completed Specimen:  Tissue Updated:  07/10/18 2017     Significant Indicator NEG     Source TISS     Site Right Knee Tissue     AFB Smear Results No acid fast bacilli seen.    GRAM STAIN [[id number]] Collected:  07/09/18 1550    Order Status:  Completed Specimen:  Wound Updated:  07/09/18 2156     Significant Indicator .     Source WND     Site Right Leg     Gram Stain Result Many WBCs.  Rare Gram positive cocci.      CULTURE WOUND W/O GRAM STAIN [[id number]]     Order Status:  No result Specimen:  Wound from Right Leg           Assessment:  Active Hospital Problems    Diagnosis   • *Infection of right knee ([facility]) [M00.9]   • Type 2 diabetes mellitus, without long-term current use of insulin ([facility]) [E11.9]   • Hyponatremia [E87.1]   • Hyperglycemia [R73.9]       Plan:  Prepatellar septic bursitis  Patient has a prosthetic knee in place  Underwent I&D on 7/9/2018  Underwent rotational flap on 7/12  Cultures for group B strep and staph  aureus  Discontinue Unasyn since patient staph aureus is resistant  Start Ancef  PICC line  Will need at least 4 weeks of antibiotics  Patient lives in [place].  PMD will have to arrange for antibiotics    Newly diagnosed diabetes mellitus  Keep blood sugars less than 150 for wound healing    Discussed with internal medicine.

## 2018-07-13 NOTE — PROGRESS NOTES
Pt A&Ox4. C/o pain this AM 4/10, medicated with 10 mg oxycodone per MAR. On 3L NC because desaturating into 80s last night; plan to wean back off today. IV running Unasyn at 200 ml/hr. Wound vac at 125, hemovac in place, immobilizer in place. Plan for day discussed with patient; call light within reach, fall precautions in place, pt encouraged to call for help as needed.

## 2018-07-13 NOTE — PROGRESS NOTES
Assumed care of pt, discussed plan of care. A&Ox4. Fatigued, irritable. On 2L O2, tolerating well. RML, RLL, LLL diminished. Denies pain at this time. Last BM, 07/10. Continent of bowel, bladder. Skin intact; swelling BLE. RLE with wound vac at 125 mmHg and hemovac in place. Assist x1. RLE pain with movement. On diabetic diet. IV, CDI, SL. Fall precautions in place; bed lowest position, treaded socks at bedside, call light within reach. All needs met at this time.

## 2018-07-13 NOTE — CARE PLAN
Problem: Bowel/Gastric:  Goal: Will not experience complications related to bowel motility  Outcome: PROGRESSING SLOWER THAN EXPECTED  Pt educated to benefits of stool softener while in the hospital and receiving narcotic pain medicine. Pt continues to decline senna stool softener.     Problem: Acute Care of the Diabetic Patient  Goal: Optimal Outcome for the Diabetic Patient  Outcome: PROGRESSING AS EXPECTED  Pt is compliant with insulin regimen.

## 2018-07-13 NOTE — THERAPY
"Physical Therapy Evaluation completed.   Bed Mobility:  Supine to Sit: Supervised  Transfers: Sit to Stand: Supervised (with FWW)  Gait: Level Of Assist: Contact Guard Assist (to SBA) with No Equipment Needed       Plan of Care: Will benefit from Physical Therapy 1 times per week if needed for stair training  Discharge Recommendations: Equipment: No Equipment Needed.  Post-acute therapy: Outpatient PT pending continued progress. Will continue to assess.     See \"Rehab Therapy-Acute\" Patient Summary Report for complete documentation.     Lillie Herrera, Student Physical Therapist  "

## 2018-07-14 PROBLEM — M70.41 PREPATELLAR BURSITIS OF RIGHT KNEE: Status: ACTIVE | Noted: 2018-07-09

## 2018-07-14 LAB
GLUCOSE BLD-MCNC: 161 MG/DL (ref 65–99)
GLUCOSE BLD-MCNC: 165 MG/DL (ref 65–99)
GLUCOSE BLD-MCNC: 165 MG/DL (ref 65–99)
GLUCOSE BLD-MCNC: 176 MG/DL (ref 65–99)

## 2018-07-14 PROCEDURE — 82962 GLUCOSE BLOOD TEST: CPT | Mod: 91

## 2018-07-14 PROCEDURE — 770006 HCHG ROOM/CARE - MED/SURG/GYN SEMI*

## 2018-07-14 PROCEDURE — 700102 HCHG RX REV CODE 250 W/ 637 OVERRIDE(OP): Performed by: INTERNAL MEDICINE

## 2018-07-14 PROCEDURE — 700102 HCHG RX REV CODE 250 W/ 637 OVERRIDE(OP): Performed by: HOSPITALIST

## 2018-07-14 PROCEDURE — A9270 NON-COVERED ITEM OR SERVICE: HCPCS | Performed by: HOSPITALIST

## 2018-07-14 PROCEDURE — 99232 SBSQ HOSP IP/OBS MODERATE 35: CPT | Performed by: INTERNAL MEDICINE

## 2018-07-14 PROCEDURE — 700111 HCHG RX REV CODE 636 W/ 250 OVERRIDE (IP): Performed by: INTERNAL MEDICINE

## 2018-07-14 PROCEDURE — 99233 SBSQ HOSP IP/OBS HIGH 50: CPT | Performed by: INTERNAL MEDICINE

## 2018-07-14 RX ADMIN — ENOXAPARIN SODIUM 40 MG: 100 INJECTION SUBCUTANEOUS at 05:33

## 2018-07-14 RX ADMIN — INSULIN HUMAN 3 UNITS: 100 INJECTION, SOLUTION PARENTERAL at 17:46

## 2018-07-14 RX ADMIN — OXYCODONE HYDROCHLORIDE 10 MG: 5 TABLET ORAL at 17:54

## 2018-07-14 RX ADMIN — INSULIN HUMAN 3 UNITS: 100 INJECTION, SOLUTION PARENTERAL at 12:17

## 2018-07-14 RX ADMIN — INSULIN HUMAN 3 UNITS: 100 INJECTION, SOLUTION PARENTERAL at 20:13

## 2018-07-14 RX ADMIN — INSULIN GLARGINE 15 UNITS: 100 INJECTION, SOLUTION SUBCUTANEOUS at 17:45

## 2018-07-14 RX ADMIN — CEFAZOLIN SODIUM 2 G: 2 INJECTION, SOLUTION INTRAVENOUS at 05:33

## 2018-07-14 RX ADMIN — CEFAZOLIN SODIUM 2 G: 2 INJECTION, SOLUTION INTRAVENOUS at 14:43

## 2018-07-14 RX ADMIN — CEFAZOLIN SODIUM 2 G: 2 INJECTION, SOLUTION INTRAVENOUS at 20:06

## 2018-07-14 RX ADMIN — OXYCODONE HYDROCHLORIDE 10 MG: 5 TABLET ORAL at 08:03

## 2018-07-14 RX ADMIN — STANDARDIZED SENNA CONCENTRATE AND DOCUSATE SODIUM 2 TABLET: 8.6; 5 TABLET, FILM COATED ORAL at 05:33

## 2018-07-14 RX ADMIN — INSULIN HUMAN 3 UNITS: 100 INJECTION, SOLUTION PARENTERAL at 08:02

## 2018-07-14 ASSESSMENT — ENCOUNTER SYMPTOMS
BLURRED VISION: 0
TINGLING: 0
CLAUDICATION: 0
NECK PAIN: 0
DIZZINESS: 0
FEVER: 0
SPEECH CHANGE: 0
FALLS: 0
FOCAL WEAKNESS: 0
DEPRESSION: 0
ABDOMINAL PAIN: 0
PHOTOPHOBIA: 0
SENSORY CHANGE: 0
HEMOPTYSIS: 0
SHORTNESS OF BREATH: 1
EYE REDNESS: 0
VOMITING: 0
NAUSEA: 0
CONSTIPATION: 0
DIAPHORESIS: 0
WEIGHT LOSS: 0
COUGH: 0
SPUTUM PRODUCTION: 0

## 2018-07-14 ASSESSMENT — PAIN SCALES - GENERAL
PAINLEVEL_OUTOF10: 2
PAINLEVEL_OUTOF10: 6
PAINLEVEL_OUTOF10: 4

## 2018-07-14 ASSESSMENT — LIFESTYLE VARIABLES: SUBSTANCE_ABUSE: 0

## 2018-07-14 NOTE — PROGRESS NOTES
"Assumed care of pt, discussed plan of care. A&Ox4. Irritable but compliant with care. On 2L O2, tolerating well. Complains of 7/10 pain to right knee, medicated per MAR. Last BM, 07/10. Continent of bowel, bladder. Skin intact; swelling BLE. RLE with dressing in place; has wound vac at 125 mmHg and hemovac. Assist x1. RLE pain with movement. On diabetic diet. IV, CDI, SL. Fall precautions in place; bed lowest position, treaded socks at bedside, call light within reach. All needs met at this time.     Pt irritable about plan for PICC line. States \"I am not giving consent for nothing. And I will tell that doctor myself! Nobody's doing anything, they already made my leg worse.\" Provided pt with education on plan for long-term abx. Per pt, he was never told about PICC placement plan and continues to refuse wanting the procedure. Will update day shift to discuss during rounds.  "

## 2018-07-14 NOTE — PROGRESS NOTES
[facility] Hospitalist Progress Note    Date of Service: 7/14/2018    Chief Complaint  62 y.o. male admitted 7/9/2018 with signs of knee infection.  Patient received surgery I&D.  Infectious disease specialist was consulted.    Interval Problem Update  7/10 patient has been stable overnight, sleepy during the day.  has wound VAC in place. Ortho on. Patient's pain is local, 6-8/10, intermittent and does not radiate to other location, sharp and with some tingling. Can be controlled by pain meds. Dressing in place. Patient otherwise denies fever, chills, nausea, vomiting, adb pain, SOB, CP, headache, constipation, diarrhea, cough, or sputum.  7/11 patient has been stable overnight. Patient's pain is local, 6-8/10, intermittent and does not radiate to other location, sharp and with some tingling. Can be controlled by pain meds. Dressing in place.  Patient otherwise denies fever, chills, nausea, vomiting, adb pain, SOB, CP, headache, constipation, diarrhea, cough, or sputum.  7/12 I/D again today. Patient otherwise denies fever, chills, nausea, vomiting, adb pain, SOB, CP, headache, constipation, diarrhea, cough, or sputum.  7/13 patient had been stable overnight, tolerated procedure yesterday, patient complained of knee tenderness. Patient's pain is local, 6-8/10, intermittent and does not radiate to other location, sharp and with some tingling. Can be controlled by pain meds. Dressing in place.  Patient wants to go home.  Infectious disease specialist recommend total of 4 weeks of at least antibiotics.Patient otherwise denies fever, chills, nausea, vomiting, adb pain, SOB, CP, headache, constipation, diarrhea, cough, or sputum.  7/14 patient will need PICC line in place, however initially patient refused PICC line.  After prolonged discussion with patient and patient agreed with PICC line insertion.  Patient also complains of not able to shave and wants to have eyeglasses. Patient otherwise denies fever, chills, nausea,  vomiting, adb pain, SOB, CP, headache, constipation, diarrhea, cough, or sputum.    Consultants/Specialty  Ortho  Id     Disposition  tbd        Review of Systems   Constitutional: Negative for diaphoresis, fever and weight loss.   HENT: Negative for hearing loss and tinnitus.    Eyes: Negative for blurred vision, photophobia and redness.   Respiratory: Negative for hemoptysis and sputum production.    Cardiovascular: Negative for chest pain and claudication.   Gastrointestinal: Negative for abdominal pain, constipation and nausea.   Genitourinary: Negative for dysuria, frequency and hematuria.   Musculoskeletal: Positive for joint pain. Negative for falls and neck pain.   Skin: Negative for rash.   Neurological: Negative for dizziness, tingling, sensory change, speech change and focal weakness.   Psychiatric/Behavioral: Negative for depression, substance abuse and suicidal ideas.      Physical Exam  Laboratory/Imaging   Hemodynamics  Temp (24hrs), Av.7 °C (98 °F), Min:36.4 °C (97.5 °F), Max:37 °C (98.6 °F)   Temperature: 36.4 °C (97.5 °F)  Pulse  Av.9  Min: 59  Max: 98    Blood Pressure: 128/70      Respiratory      Respiration: 16, Pulse Oximetry: 96 %     Work Of Breathing / Effort: Mild  RML Breath Sounds: Diminished, RLL Breath Sounds: Diminished, LLL Breath Sounds: Diminished    Fluids    Intake/Output Summary (Last 24 hours) at 18 0909  Last data filed at 18 0600   Gross per 24 hour   Intake              720 ml   Output             3067 ml   Net            -2347 ml       Nutrition  Orders Placed This Encounter   Procedures   • Diet Order Diabetic     Standing Status:   Standing     Number of Occurrences:   1     Order Specific Question:   Diet:     Answer:   Diabetic [3]     Physical Exam   Constitutional: He is oriented to person, place, and time. No distress.   HENT:   Head: Normocephalic.   Eyes: Conjunctivae are normal. Left eye exhibits no discharge. No scleral icterus.   Neck:  Normal range of motion. Neck supple. No JVD present. No thyromegaly present.   Cardiovascular: Normal rate, regular rhythm and normal heart sounds.  Exam reveals no gallop and no friction rub.    Pulmonary/Chest: Effort normal and breath sounds normal. He has no wheezes. He has no rales. He exhibits no tenderness.   Abdominal: Soft. He exhibits no mass. There is no tenderness. There is no rebound and no guarding.   Musculoskeletal: He exhibits no edema.   Right knee tender to touch, warm to touch, possible infection, status post surgery.   Lymphadenopathy:     He has no cervical adenopathy.   Neurological: He is alert and oriented to person, place, and time. He displays normal reflexes. No cranial nerve deficit.   Skin: Skin is dry. No rash noted. He is not diaphoretic.   Psychiatric: He has a normal mood and affect.   Nursing note and vitals reviewed.      Recent Labs      07/12/18   0411  07/13/18   0326   WBC  5.5  6.1   RBC  4.69*  4.34*   HEMOGLOBIN  14.1  13.1*   HEMATOCRIT  43.7  40.6*   MCV  93.2  93.5   MCH  30.1  30.2   MCHC  32.3*  32.3*   RDW  45.1  46.1   PLATELETCT  278  276   MPV  10.1  9.9     Recent Labs      07/12/18   0411  07/13/18   0326   SODIUM  137  137   POTASSIUM  4.0  3.9   CHLORIDE  103  104   CO2  25  25   GLUCOSE  176*  158*   BUN  14  18   CREATININE  0.88  0.93   CALCIUM  8.9  8.8                      Assessment/Plan     * Infection of right knee ([facility])- (present on admission), right prepatellar septic bursitis   Assessment & Plan    s/p I/D 7/9 and 7/12  Ortho on  Culture pending  ID on, orderPICC line.  Patient initially declined however after prolonged discussion patient agreed currently.  Close monitor  Cont iv abx total of 4 weeks antibiotics  Patient has infected bursitis        Hyponatremia- (present on admission)   Assessment & Plan    Resolved Possible from hyperglycemia  Also concern of dehydration  IV fluid rehydration finished        Type 2 diabetes mellitus, without  long-term current use of insulin ([facility])- (present on admission)   Assessment & Plan    a1c 11.93  DM diet  Lantus 15iu with ISS to cover  Monitor BS  Patient has type 2 diabetes without long-term use of insulin without complication  Patient's blood sugar not controlled with hyperglycemia, however currently better        Hyperglycemia- (present on admission)   Assessment & Plan    Glucose was 223 in the ER at [facility]  Sliding scale insulin ordered  HbA1c 11.9, treat as abov for DM 2          Quality-Core Measures   Reviewed items::  Labs reviewed, Medications reviewed and Radiology images reviewed  Vivas catheter::  No Vivas  DVT prophylaxis pharmacological::  Enoxaparin (Lovenox)  DVT prophylaxis - mechanical:  SCDs  Antibiotics:  Treating active infection/contamination beyond 24 hours perioperative coverage   For complexity-based billing, please refer to the history, exam, and decison making above. In addition, I spent >35 minutes caring for the patient today. More than 50% of the time was spent counseling and coordinating care.    I have discussed with RN and CM and SW and other consultants about patient's plan.

## 2018-07-14 NOTE — PROGRESS NOTES
Pt has agreed to get PICC after speaking to MD Holloway. RN left a message on PICC answering machine.

## 2018-07-14 NOTE — PROGRESS NOTES
This RN spoke with pt's primary RN. Pt currently refusing PICC. Pt has been provided education by multiple people including ELLY MATTA per Kofi BELLAMY and pt continues to refuse. Rn to call [phone] if pt agreeable to PICC placement.

## 2018-07-14 NOTE — CARE PLAN
Problem: Knowledge Deficit  Goal: Knowledge of disease process/condition, treatment plan, diagnostic tests, and medications will improve    Intervention: Explain information regarding disease process/condition, treatment plan, diagnostic tests, and medications and document in education  Educated pt on plan of care, scheduled medications. Pt irritable and upset about plan for PICC line. Continues to refuse and will not sign consent.       Problem: Pain Management  Goal: Pain level will decrease to patient's comfort goal    Intervention: Follow pain managment plan developed in collaboration with patient and Interdisciplinary Team  Pt complains of pain to right knee, medicated per MAR. Pt has PRN pain meds available for pain control. Pt tends not to ask for pain meds and waits until pain is unbearable. Discussed non-pharm methods available to aid in pain control as well.

## 2018-07-14 NOTE — PROGRESS NOTES
"   Orthopaedic Progress Note    Interval changes:  Patient non compliant with knee immobilizer brace  DC HV and incisional wound vac tomorrow   If wound OK may DC monday    ROS - Patient denies any new issues.  Pain well controlled.    Blood pressure 123/74, pulse 71, temperature 36.8 °C (98.2 °F), resp. rate 16, height 1.822 m (5' 11.73\"), weight 114 kg (251 lb 5.2 oz), SpO2 94 %.      Patient seen and examined  No acute distress  Breathing non labored  RRR  RLE HV in place min output, incisional vac in place min output, knee immoblizer at ankle and barely attached.    Recent Labs      07/12/18   0411  07/13/18   0326   WBC  5.5  6.1   RBC  4.69*  4.34*   HEMOGLOBIN  14.1  13.1*   HEMATOCRIT  43.7  40.6*   MCV  93.2  93.5   MCH  30.1  30.2   MCHC  32.3*  32.3*   RDW  45.1  46.1   PLATELETCT  278  276   MPV  10.1  9.9       Active Hospital Problems    Diagnosis   • Type 2 diabetes mellitus, without long-term current use of insulin ([facility]) [E11.9]   • Hyponatremia [E87.1]   • Hyperglycemia [R73.9]   • Prepatellar bursitis of right knee [M70.41]       Assessment/Plan:  DC HV and incisional vac tomorrow  POD#2 S/P :  1.  Rotational flap coverage.  2.  Application of wound VAC.  Wt bearing status - WBAT  Wound care/Drains - vac DC tomorrow  Future Procedures - none planned   Lovenox: Start 7/10, Duration-until ambulatory > 150'  Sutures/Staples out- 10-14 days post operatively  PT/OT-initiated  Antibiotics: ancef 2 g IV Q8  DVT Prophylaxis- TEDS/SCDs/Foot pumps  Vivas-none  Case Coordination for Discharge Planning - Disposition home   "

## 2018-07-14 NOTE — PROGRESS NOTES
Infectious Disease Progress Note    Author: Venessa Holland M.D. Date & Time of service: 2018  9:05 AM    Chief Complaint:  Right knee infection    Interval History:  2018 T-max 98.2 WBC 5 platelets 241 feels slightly better  -T-max 98.1 patient had a rotational flap yesterday.  Complains of pain in the knee.  Otherwise doing well.  Had slight shortness of breath.  714 AF pt c/o not being able to get glasses or a razor, refusing PICC and does not have PCP back in [place]  Labs Reviewed, Medications Reviewed, Radiology Reviewed and Wound Reviewed.    Review of Systems:  Review of Systems   Constitutional: Positive for malaise/fatigue. Negative for fever.   Respiratory: Positive for shortness of breath. Negative for cough.         Improving   Cardiovascular: Negative for chest pain.   Gastrointestinal: Negative for abdominal pain, nausea and vomiting.   Genitourinary: Negative for dysuria.   Musculoskeletal: Positive for joint pain.        Right knee hurts   Neurological: Negative for sensory change and speech change.       Hemodynamics:  Temp (24hrs), Av.7 °C (98 °F), Min:36.4 °C (97.5 °F), Max:37 °C (98.6 °F)  Temperature: 36.4 °C (97.5 °F)  Pulse  Av.9  Min: 59  Max: 98   Blood Pressure: 128/70       Physical Exam:  Physical Exam   Constitutional: He is oriented to person, place, and time. He appears well-developed. No distress.   Obese   HENT:   Head: Normocephalic and atraumatic.   Mouth/Throat: No oropharyngeal exudate.   Eyes: EOM are normal. Pupils are equal, round, and reactive to light. No scleral icterus.   Neck: Neck supple.   Cardiovascular: Normal rate and regular rhythm.    No murmur heard.  Pulmonary/Chest: Effort normal. He has no wheezes. He has no rales.   Abdominal: Soft. There is no tenderness.   Musculoskeletal: He exhibits edema.   Right knee in surgical dressing +hemovac   Neurological: He is alert and oriented to person, place, and time. No cranial nerve deficit.  Coordination normal.   Psychiatric:   Irritable   Vitals reviewed.      Meds:    Current Facility-Administered Medications:   •  ceFAZolin  •  insulin glargine  •  enoxaparin (LOVENOX) injection  •  senna-docusate **AND** polyethylene glycol/lytes **AND** magnesium hydroxide **AND** bisacodyl  •  ondansetron  •  ondansetron  •  promethazine  •  promethazine  •  prochlorperazine  •  insulin regular **AND** Accu-Chek ACHS **AND** NOTIFY MD and PharmD **AND** glucose 4 g **AND** dextrose 50%  •  acetaminophen  •  oxyCODONE immediate-release  •  morphine injection    Labs:  Recent Labs      07/12/18   0411  07/13/18   0326   WBC  5.5  6.1   RBC  4.69*  4.34*   HEMOGLOBIN  14.1  13.1*   HEMATOCRIT  43.7  40.6*   MCV  93.2  93.5   MCH  30.1  30.2   RDW  45.1  46.1   PLATELETCT  278  276   MPV  10.1  9.9   NEUTSPOLYS  52.50  60.20   LYMPHOCYTES  31.50  23.20   MONOCYTES  10.90  12.30   EOSINOPHILS  2.40  2.00   BASOPHILS  0.90  0.70     Recent Labs      07/12/18   0411  07/13/18   0326   SODIUM  137  137   POTASSIUM  4.0  3.9   CHLORIDE  103  104   CO2  25  25   GLUCOSE  176*  158*   BUN  14  18     Recent Labs      07/12/18   0411  07/13/18   0326   CREATININE  0.88  0.93       Imaging:  Dx-knee 2- Right    Result Date: 7/9/2018 7/9/2018 4:35 PM HISTORY/REASON FOR EXAM: Anterior knee pain without injury. TECHNIQUE/EXAM DESCRIPTION AND NUMBER OF VIEWS:  2 views of the right knee. COMPARISON: None FINDINGS: Anterior to the patella there is soft tissue swelling and gas. Subjacent edema Total knee arthroplasty with patellar resurfacing has been performed.  The tibial component is cemented. There is also cement anterior to the tibial tubercle/proximal tibia No periprosthetic fracture is detected. There is question of lucency underlying the medial margin of the tibial component Ossification seen superomedial to the femur is likely post traumatic     Prepatellar soft tissue swelling and gas is compatible with infection and/or  laceration. No gross intra-articular extension is seen Total knee arthroplasty. Lucency underlying the medial margin of the tibia could be chronic or secondary to loosening. Comparison with prior x-rays would prove helpful       Micro:  Results     Procedure Component Value Units Date/Time    CULTURE TISSUE W/ GRM STAIN [[id number]]  (Abnormal) Collected:  07/09/18 1956    Order Status:  Completed Specimen:  Tissue Updated:  07/12/18 1455     Significant Indicator POS (POS)     Source TISS     Site Right Knee Tissue     Tissue Culture -- (A)     Gram Stain Result Few WBCs.  No organisms seen.       Tissue Culture Streptococcus agalactiae (Group B)  Moderate growth   (A)    ANAEROBIC CULTURE [[id number]] Collected:  07/09/18 1956    Order Status:  Completed Specimen:  Tissue Updated:  07/12/18 1455     Significant Indicator NEG     Source TISS     Site Right Knee Tissue     Anaerobic Culture, Culture Res No Anaerobes isolated.    AFB CULTURE [[id number]] Collected:  07/09/18 1956    Order Status:  Completed Specimen:  Tissue Updated:  07/12/18 1455     Significant Indicator NEG     Source TISS     Site Right Knee Tissue     AFB Culture Culture in progress.     AFB Smear Results No acid fast bacilli seen.    FUNGAL CULTURE [[id number]] Collected:  07/09/18 1956    Order Status:  Completed Specimen:  Tissue Updated:  07/12/18 1455     Significant Indicator NEG     Source TISS     Site Right Knee Tissue     Fungal Culture Culture in progress.    CULTURE WOUND W/ GRAM STAIN [[id number]]  (Abnormal)  (Susceptibility) Collected:  07/09/18 1550    Order Status:  Completed Specimen:  Wound Updated:  07/12/18 0841     Significant Indicator POS (POS)     Source WND     Site Right Leg     Culture Result Wound -- (A)     Gram Stain Result Many WBCs.  Rare Gram positive cocci.       Culture Result Wound Streptococcus agalactiae (Group B)  Moderate growth   (A)      Staphylococcus aureus  Light growth   (A)    Culture & Susceptibility      STAPHYLOCOCCUS AUREUS     Antibiotic Sensitivity Microscan Unit Status    Ampicillin/sulbactam Resistant <=8/4 mcg/mL Final    Method: SENSITIVITY, ANTOINETTE    Clindamycin Sensitive <=0.5 mcg/mL Final    Method: SENSITIVITY, ANTOINETTE    Daptomycin Sensitive 1 mcg/mL Final    Method: SENSITIVITY, ANTOINETTE    Erythromycin Resistant >4 mcg/mL Final    Method: SENSITIVITY, ANTOINETTE    Moxifloxacin Sensitive 1 mcg/mL Final    Method: SENSITIVITY, ANTOINETTE    Oxacillin Resistant 2 mcg/mL Final    Method: SENSITIVITY, ANTOINETTE    Tetracycline Sensitive <=4 mcg/mL Final    Method: SENSITIVITY, ANTOINETTE    Trimeth/Sulfa Sensitive <=0.5/9.5 mcg/mL Final    Method: SENSITIVITY, ANTOINETTE    Vancomycin Sensitive 1 mcg/mL Final    Method: SENSITIVITY, ANTOINETTE                       GRAM STAIN [[id number]] Collected:  07/09/18 1956    Order Status:  Completed Specimen:  Tissue Updated:  07/10/18 2017     Significant Indicator .     Source TISS     Site Right Knee Tissue     Gram Stain Result Few WBCs.  No organisms seen.      ACID FAST STAIN [[id number]] Collected:  07/09/18 1956    Order Status:  Completed Specimen:  Tissue Updated:  07/10/18 2017     Significant Indicator NEG     Source TISS     Site Right Knee Tissue     AFB Smear Results No acid fast bacilli seen.    GRAM STAIN [[id number]] Collected:  07/09/18 1550    Order Status:  Completed Specimen:  Wound Updated:  07/09/18 2156     Significant Indicator .     Source WND     Site Right Leg     Gram Stain Result Many WBCs.  Rare Gram positive cocci.      CULTURE WOUND W/O GRAM STAIN [[id number]]     Order Status:  No result Specimen:  Wound from Right Leg           Assessment:  Active Hospital Problems    Diagnosis   • *Infection of right knee ([facility]) [M00.9]   • Type 2 diabetes mellitus, without long-term current use of insulin ([facility]) [E11.9]   • Hyponatremia [E87.1]   • Hyperglycemia [R73.9]       Plan:  Right prepatellar septic bursitis  Patient has a prosthetic knee in place  Underwent I&D on 7/9/2018  Underwent  rotational flap on 7/12  Cultures for group B strep and staph aureus  Continue Ancef  PICC line bu pt refusing  Plan for 4 weeks from 7/9.  Stop date 08/06/18    Newly diagnosed diabetes mellitus  HgA1c 11.9  Keep blood sugars less than 150 for wound healing and to control infection    Difficult discharge. Pt is from [place] and has no PCP. Also refusing PICC line    Discussed with internal medicine RN and Dr. Holloway

## 2018-07-14 NOTE — PROGRESS NOTES
"Pt is AOX4- irritable. On 2LNC. He has hemovac and wound vac on right knee. Immobilizer was adjusted this morning by this RN, however when RN went back into the room pt had undone the immobilizer, stating \"it causes me pain\". Pt education was given. VSS will continue to monitor.   "

## 2018-07-14 NOTE — CARE PLAN
Problem: Safety  Goal: Will remain free from injury  Outcome: PROGRESSING AS EXPECTED  Non-skid socks, call bell within reach, hourly rounding     Problem: Knowledge Deficit  Goal: Knowledge of disease process/condition, treatment plan, diagnostic tests, and medications will improve  Outcome: PROGRESSING SLOWER THAN EXPECTED  Pt has needed multiple people including, RN and MDs, about need for PICC line and long term antibiotic use.     Problem: Pain Management  Goal: Pain level will decrease to patient's comfort goal  Outcome: PROGRESSING AS EXPECTED  PRN pain medication given with good effect.

## 2018-07-15 ENCOUNTER — APPOINTMENT (OUTPATIENT)
Dept: RADIOLOGY | Facility: MEDICAL CENTER | Age: 63
DRG: 464 | End: 2018-07-15
Attending: INTERNAL MEDICINE
Payer: COMMERCIAL

## 2018-07-15 LAB
GLUCOSE BLD-MCNC: 137 MG/DL (ref 65–99)
GLUCOSE BLD-MCNC: 155 MG/DL (ref 65–99)
GLUCOSE BLD-MCNC: 166 MG/DL (ref 65–99)
GLUCOSE BLD-MCNC: 182 MG/DL (ref 65–99)

## 2018-07-15 PROCEDURE — 36569 INSJ PICC 5 YR+ W/O IMAGING: CPT

## 2018-07-15 PROCEDURE — 82962 GLUCOSE BLOOD TEST: CPT | Mod: 91

## 2018-07-15 PROCEDURE — 99232 SBSQ HOSP IP/OBS MODERATE 35: CPT | Performed by: INTERNAL MEDICINE

## 2018-07-15 PROCEDURE — 700111 HCHG RX REV CODE 636 W/ 250 OVERRIDE (IP): Performed by: INTERNAL MEDICINE

## 2018-07-15 PROCEDURE — 700102 HCHG RX REV CODE 250 W/ 637 OVERRIDE(OP): Performed by: INTERNAL MEDICINE

## 2018-07-15 PROCEDURE — 05HY33Z INSERTION OF INFUSION DEVICE INTO UPPER VEIN, PERCUTANEOUS APPROACH: ICD-10-PCS | Performed by: INTERNAL MEDICINE

## 2018-07-15 PROCEDURE — 770006 HCHG ROOM/CARE - MED/SURG/GYN SEMI*

## 2018-07-15 RX ADMIN — ENOXAPARIN SODIUM 40 MG: 100 INJECTION SUBCUTANEOUS at 05:12

## 2018-07-15 RX ADMIN — INSULIN HUMAN 3 UNITS: 100 INJECTION, SOLUTION PARENTERAL at 17:47

## 2018-07-15 RX ADMIN — CEFAZOLIN SODIUM 2 G: 2 INJECTION, SOLUTION INTRAVENOUS at 05:12

## 2018-07-15 RX ADMIN — INSULIN HUMAN 3 UNITS: 100 INJECTION, SOLUTION PARENTERAL at 08:23

## 2018-07-15 RX ADMIN — INSULIN GLARGINE 15 UNITS: 100 INJECTION, SOLUTION SUBCUTANEOUS at 17:47

## 2018-07-15 RX ADMIN — CEFAZOLIN SODIUM 2 G: 2 INJECTION, SOLUTION INTRAVENOUS at 21:28

## 2018-07-15 RX ADMIN — CEFAZOLIN SODIUM 2 G: 2 INJECTION, SOLUTION INTRAVENOUS at 13:13

## 2018-07-15 ASSESSMENT — ENCOUNTER SYMPTOMS
SENSORY CHANGE: 0
DIARRHEA: 0
SPUTUM PRODUCTION: 0
PHOTOPHOBIA: 0
CLAUDICATION: 0
FALLS: 0
SHORTNESS OF BREATH: 0
DIZZINESS: 0
FOCAL WEAKNESS: 0
COUGH: 0
ABDOMINAL PAIN: 0
TINGLING: 0
VOMITING: 0
FLANK PAIN: 0
BACK PAIN: 0
DOUBLE VISION: 0
NAUSEA: 0
SPEECH CHANGE: 0
HEMOPTYSIS: 0
DEPRESSION: 0
EYE REDNESS: 0
FEVER: 0
CHILLS: 0
HEARTBURN: 0

## 2018-07-15 ASSESSMENT — PATIENT HEALTH QUESTIONNAIRE - PHQ9
1. LITTLE INTEREST OR PLEASURE IN DOING THINGS: NOT AT ALL
SUM OF ALL RESPONSES TO PHQ9 QUESTIONS 1 AND 2: 0
2. FEELING DOWN, DEPRESSED, IRRITABLE, OR HOPELESS: NOT AT ALL

## 2018-07-15 ASSESSMENT — PAIN SCALES - GENERAL
PAINLEVEL_OUTOF10: 0
PAINLEVEL_OUTOF10: 0

## 2018-07-15 ASSESSMENT — LIFESTYLE VARIABLES: SUBSTANCE_ABUSE: 0

## 2018-07-15 NOTE — PROGRESS NOTES
"   Orthopaedic Progress Note    Interval changes:  HV and incisional wound vac removed and 4x4 tegaderm dressing placed   Incision without complication  Cleared for DC home tomorrow if cleared by medicine and ID team    ROS - Patient denies any new issues.  Pain well controlled.    Blood pressure 133/73, pulse 80, temperature 36.8 °C (98.3 °F), resp. rate 16, height 1.822 m (5' 11.73\"), weight 114 kg (251 lb 5.2 oz), SpO2 92 %.      Patient seen and examined  No acute distress  Breathing non labored  RRR  Right knee HV and incisional vac removed, surgical incision is well approximated and is dry and clean.  There is no erythema, induration, or signs of continued active infection.      Recent Labs      07/13/18   0326   WBC  6.1   RBC  4.34*   HEMOGLOBIN  13.1*   HEMATOCRIT  40.6*   MCV  93.5   MCH  30.2   MCHC  32.3*   RDW  46.1   PLATELETCT  276   MPV  9.9       Active Hospital Problems    Diagnosis   • Type 2 diabetes mellitus, without long-term current use of insulin ([facility]) [E11.9]   • Hyponatremia [E87.1]   • Hyperglycemia [R73.9]   • Prepatellar bursitis of right knee [M70.41]       Assessment/Plan:  Patient doing well  Cleared for DC tomorrow by ortho pending medicine and ID team clearance  POD#3 S/P Rotational flap coverage.  Application of wound VAC  Wt bearing status - WBAT in knee immoblizer brace  Wound care/Drains - dressing change tomorrow and every other day  Future Procedures - none planned   Lovenox: Start 7/10, Duration-until ambulatory > 150'  Sutures/Staples out- 10-14 days post operatively  PT/OT-initiated  Antibiotics: ancef 2g IV Q8  DVT Prophylaxis- TEDS/SCDs/Foot pumps  Vivas-none  Case Coordination for Discharge Planning - Disposition home pending abx needs   "

## 2018-07-15 NOTE — CARE PLAN
"Problem: Safety  Goal: Will remain free from falls    Intervention: Implement fall precautions  Educated patient on importance of calling staff for help before mobilizing. Verbalized understanding.       Problem: Knowledge Deficit  Goal: Knowledge of disease process/condition, treatment plan, diagnostic tests, and medications will improve    Intervention: Explain information regarding disease process/condition, treatment plan, diagnostic tests, and medications and document in education  Patient irritable with other staff. \"You're just doing this to torture me (Pointing at knee immobilizer). I want out of here\" Empathized with patient. Educated patient on the purpose of the knee brace and his discharge plans. Patient appeared calmer. Verbalized understanding.         "

## 2018-07-15 NOTE — PROGRESS NOTES
Vascular Access Team     Date of Insertion: 7/15/18  Arm Circumference: 35  Internal length: 43  External Length: hubbed  Vein Occupancy %: 41  Reason for PICC: IV abx  Labs: WBC 6.1, , INR 1.06, BUN 18, Cr 0.93, GFR >60    Consents confirmed, vessel patency confirmed with ultrasound. Risks and benefits of procedure explained to patient and education regarding central line associated bloodstream infections provided. Questions answered.     PICC placed in RUE per MD order with ultrasound guidance, initial arm circumference 35cm. 4 Fr, 1 lumen PICC placed in right basilic vein after 1 attempt(s). 2 cc's of 1% lidocaine injected intradermally, 21 gauge microintroducer needle and modified Seldinger technique used. 43 cm catheter inserted with good blood return. Secured at hub. Each lumen flushed without resistance with 10 mL 0.9% normal saline. PICC line secured with Biopatch and Tegaderm.     PICC placement is confirmed by nurse using 3CG technology. PICC line is appropriate for use at this time. Pt tolerated procedure well.  Patient condition relayed to unit RN or ordering physician via this post procedure note in the EMR.      [email or web address] PICC ref # [id number], Lot # [id number]

## 2018-07-15 NOTE — PROGRESS NOTES
Pt stated his drains are doing nothing and he has been in the hospital for 3 days for (no reason). This CNA also tried to educate pt on his mobilizer, and told that if its not on properly its not going to do any help like its ment to. Pt attitude with all subjects. RN told about mobilizer not on properly.   This CNA told pt we are only trying to help him get better. And that rudeness was not necessary.

## 2018-07-15 NOTE — PROGRESS NOTES
"RN came into room to hear pt yelling on the phone with his daughter. Pt then hung up the phone on her. He told this RN, \"I want to get out of the god damned place, I've been here two weeks and I want to leave now.\" Rn reoriented pt that he has only been here for 6 days and that the plan is for him to possibly get discharged soon. Pt said \"I got the stupid PICC in, I want to leave now! I'll find someone to give me the medicine myself\". RN reassured that we would be helping in the process of him getting outpatient antibiotics. He then told this RN to leave the room. The CNA then proceeded to go in room to take vitals. Pt refused and stood up in front of the  CNA naked and waved his arm around telling her to leave. This RN approached pt and told him that behavior was not acceptable. Pt then asked to be left alone. He is sitting in chair, with just a gown covering his lap. He also undid his immobilizer, even after giving education on the need for it. Will continue to monitor behavior.  "

## 2018-07-15 NOTE — PROGRESS NOTES
Patient's hemo vac tubing came out at 0540 while patient was ambulating. Hemovac only produced 4ml of sanguinous draininage during shift. Per surgery notes hemovac was to be discontinued today. Attempted to paged hospitalist for notification, all operators were busy. Will pass along to day shift RN. Wound vac still in place.

## 2018-07-15 NOTE — PROGRESS NOTES
Infectious Disease Progress Note    Author: Venessa Holland M.D. Date & Time of service: 7/15/2018  8:41 AM    Chief Complaint:  Right knee infection    Interval History:  2018 T-max 98.2 WBC 5 platelets 241 feels slightly better  -T-max 98.1 patient had a rotational flap yesterday.  Complains of pain in the knee.  Otherwise doing well.  Had slight shortness of breath.   AF pt c/o not being able to get glasses or a razor, refusing PICC and does not have PCP back in [place]  7/15 AF, no CBC patient with multiple complaints but happy he was able to shave and shower, now amenable to PICC line  Labs Reviewed, Medications Reviewed, Radiology Reviewed and Wound Reviewed.    Review of Systems:  Review of Systems   Constitutional: Positive for malaise/fatigue. Negative for chills and fever.   Respiratory: Negative for cough and shortness of breath.    Cardiovascular: Negative for chest pain.   Gastrointestinal: Negative for abdominal pain, nausea and vomiting.   Genitourinary: Negative for dysuria.   Musculoskeletal: Positive for joint pain.        Right knee hurts   Neurological: Negative for sensory change and speech change.       Hemodynamics:  Temp (24hrs), Av.7 °C (98 °F), Min:36.5 °C (97.7 °F), Max:36.8 °C (98.3 °F)  Temperature: 36.8 °C (98.3 °F)  Pulse  Av.9  Min: 59  Max: 98   Blood Pressure: 133/73 (nurse aware)       Physical Exam:  Physical Exam   Constitutional: He is oriented to person, place, and time. He appears well-developed. No distress.   Obese   HENT:   Head: Normocephalic and atraumatic.   Mouth/Throat: No oropharyngeal exudate.   Eyes: EOM are normal. Pupils are equal, round, and reactive to light. No scleral icterus.   Neck: Neck supple.   Cardiovascular: Normal rate and regular rhythm.    No murmur heard.  Pulmonary/Chest: Effort normal. He has no wheezes. He has no rales.   Abdominal: Soft. There is no tenderness.   Musculoskeletal: He exhibits edema.   Right knee in surgical  dressing + Valerie VAC   Neurological: He is alert and oriented to person, place, and time. No cranial nerve deficit. Coordination normal.   Psychiatric:   Irritable   Vitals reviewed.      Meds:    Current Facility-Administered Medications:   •  ceFAZolin  •  insulin glargine  •  enoxaparin (LOVENOX) injection  •  senna-docusate **AND** polyethylene glycol/lytes **AND** magnesium hydroxide **AND** bisacodyl  •  ondansetron  •  ondansetron  •  promethazine  •  promethazine  •  prochlorperazine  •  insulin regular **AND** Accu-Chek ACHS **AND** NOTIFY MD and PharmD **AND** glucose 4 g **AND** dextrose 50%  •  acetaminophen  •  oxyCODONE immediate-release  •  morphine injection    Labs:  Recent Labs      07/13/18   0326   WBC  6.1   RBC  4.34*   HEMOGLOBIN  13.1*   HEMATOCRIT  40.6*   MCV  93.5   MCH  30.2   RDW  46.1   PLATELETCT  276   MPV  9.9   NEUTSPOLYS  60.20   LYMPHOCYTES  23.20   MONOCYTES  12.30   EOSINOPHILS  2.00   BASOPHILS  0.70     Recent Labs      07/13/18   0326   SODIUM  137   POTASSIUM  3.9   CHLORIDE  104   CO2  25   GLUCOSE  158*   BUN  18     Recent Labs      07/13/18   0326   CREATININE  0.93       Imaging:  Dx-knee 2- Right    Result Date: 7/9/2018 7/9/2018 4:35 PM HISTORY/REASON FOR EXAM: Anterior knee pain without injury. TECHNIQUE/EXAM DESCRIPTION AND NUMBER OF VIEWS:  2 views of the right knee. COMPARISON: None FINDINGS: Anterior to the patella there is soft tissue swelling and gas. Subjacent edema Total knee arthroplasty with patellar resurfacing has been performed.  The tibial component is cemented. There is also cement anterior to the tibial tubercle/proximal tibia No periprosthetic fracture is detected. There is question of lucency underlying the medial margin of the tibial component Ossification seen superomedial to the femur is likely post traumatic     Prepatellar soft tissue swelling and gas is compatible with infection and/or laceration. No gross intra-articular extension is seen  Total knee arthroplasty. Lucency underlying the medial margin of the tibia could be chronic or secondary to loosening. Comparison with prior x-rays would prove helpful       Micro:  Results     Procedure Component Value Units Date/Time    CULTURE TISSUE W/ GRM STAIN [[id number]]  (Abnormal) Collected:  07/09/18 1956    Order Status:  Completed Specimen:  Tissue Updated:  07/12/18 1455     Significant Indicator POS (POS)     Source TISS     Site Right Knee Tissue     Tissue Culture -- (A)     Gram Stain Result Few WBCs.  No organisms seen.       Tissue Culture Streptococcus agalactiae (Group B)  Moderate growth   (A)    ANAEROBIC CULTURE [[id number]] Collected:  07/09/18 1956    Order Status:  Completed Specimen:  Tissue Updated:  07/12/18 1455     Significant Indicator NEG     Source TISS     Site Right Knee Tissue     Anaerobic Culture, Culture Res No Anaerobes isolated.    AFB CULTURE [[id number]] Collected:  07/09/18 1956    Order Status:  Completed Specimen:  Tissue Updated:  07/12/18 1455     Significant Indicator NEG     Source TISS     Site Right Knee Tissue     AFB Culture Culture in progress.     AFB Smear Results No acid fast bacilli seen.    FUNGAL CULTURE [[id number]] Collected:  07/09/18 1956    Order Status:  Completed Specimen:  Tissue Updated:  07/12/18 1455     Significant Indicator NEG     Source TISS     Site Right Knee Tissue     Fungal Culture Culture in progress.    CULTURE WOUND W/ GRAM STAIN [[id number]]  (Abnormal)  (Susceptibility) Collected:  07/09/18 1550    Order Status:  Completed Specimen:  Wound Updated:  07/12/18 0841     Significant Indicator POS (POS)     Source WND     Site Right Leg     Culture Result Wound -- (A)     Gram Stain Result Many WBCs.  Rare Gram positive cocci.       Culture Result Wound Streptococcus agalactiae (Group B)  Moderate growth   (A)      Staphylococcus aureus  Light growth   (A)    Culture & Susceptibility     STAPHYLOCOCCUS AUREUS     Antibiotic Sensitivity  Microscan Unit Status    Ampicillin/sulbactam Resistant <=8/4 mcg/mL Final    Method: SENSITIVITY, ANTOINETTE    Clindamycin Sensitive <=0.5 mcg/mL Final    Method: SENSITIVITY, ANTOINETTE    Daptomycin Sensitive 1 mcg/mL Final    Method: SENSITIVITY, ANTOINETTE    Erythromycin Resistant >4 mcg/mL Final    Method: SENSITIVITY, ANTOINETTE    Moxifloxacin Sensitive 1 mcg/mL Final    Method: SENSITIVITY, ANTOINETTE    Oxacillin Resistant 2 mcg/mL Final    Method: SENSITIVITY, ANTOINETTE    Tetracycline Sensitive <=4 mcg/mL Final    Method: SENSITIVITY, ANTOINETTE    Trimeth/Sulfa Sensitive <=0.5/9.5 mcg/mL Final    Method: SENSITIVITY, ANTOINETTE    Vancomycin Sensitive 1 mcg/mL Final    Method: SENSITIVITY, ANTOINETTE                       GRAM STAIN [[id number]] Collected:  07/09/18 1956    Order Status:  Completed Specimen:  Tissue Updated:  07/10/18 2017     Significant Indicator .     Source TISS     Site Right Knee Tissue     Gram Stain Result Few WBCs.  No organisms seen.      ACID FAST STAIN [[id number]] Collected:  07/09/18 1956    Order Status:  Completed Specimen:  Tissue Updated:  07/10/18 2017     Significant Indicator NEG     Source TISS     Site Right Knee Tissue     AFB Smear Results No acid fast bacilli seen.    GRAM STAIN [[id number]] Collected:  07/09/18 1550    Order Status:  Completed Specimen:  Wound Updated:  07/09/18 2156     Significant Indicator .     Source WND     Site Right Leg     Gram Stain Result Many WBCs.  Rare Gram positive cocci.      CULTURE WOUND W/O GRAM STAIN [[id number]]     Order Status:  No result Specimen:  Wound from Right Leg           Assessment:  Active Hospital Problems    Diagnosis   • *Infection of right knee ([facility]) [M00.9]   • Type 2 diabetes mellitus, without long-term current use of insulin ([facility]) [E11.9]   • Hyponatremia [E87.1]   • Hyperglycemia [R73.9]       Plan:  Right prepatellar septic bursitis  Patient has a prosthetic knee in place  Underwent I&D on 7/9/2018  Underwent rotational flap on 7/12  Cultures for group B strep and  staph aureus  Continue Ancef  PICC line -patient now amenable  Plan for 6 weeks weeks from 7/9 due to concern for possible hardware infection  Stop date 08/20/18    Newly diagnosed diabetes mellitus  HgA1c 11.9  Keep blood sugars less than 150 for wound healing and to control infection    Difficult discharge. Pt is from [place] and has no PCP but expects to return home for intravenous antibiotic    Discussed with internal medicine/ Dr. Holloway

## 2018-07-15 NOTE — PROGRESS NOTES
Pt is in a better mood today than yesterday. He is less irritable and willing to engage in friendly conversation. Pt is on 1.5LNC- his sats on room air were 88% per CNA. Knee immobilizer was adjusted and education to keep on was given. Wound vac was in place. Hemovac was accidentally removed on nite shift. RN called Paris Smith to make him aware. PICC RN called this AM and said that they would try to come this morning to place line, pt is still agreeable to get it when this RN asked. Pt denies pain at this time. VSS. Will continue to monitor.

## 2018-07-15 NOTE — PROGRESS NOTES
"Patient is AOx4. Plan of care discussed. Reinforced education about immobilizer brace and purpose of drains. Empathized with patient's feelings of \"boredom\". Patient receptive to teachings, verbalized understanding.  Denies pain.  Call light in use,  treaded socks on, bed locked in low position  "

## 2018-07-15 NOTE — PROGRESS NOTES
[facility] Hospitalist Progress Note    Date of Service: 7/15/2018    Chief Complaint  62 y.o. male admitted 7/9/2018 with signs of knee infection.  Patient received surgery I&D.  Infectious disease specialist was consulted.    Interval Problem Update  7/10 patient has been stable overnight, sleepy during the day.  has wound VAC in place. Ortho on. Patient's pain is local, 6-8/10, intermittent and does not radiate to other location, sharp and with some tingling. Can be controlled by pain meds. Dressing in place. Patient otherwise denies fever, chills, nausea, vomiting, adb pain, SOB, CP, headache, constipation, diarrhea, cough, or sputum.  7/11 patient has been stable overnight. Patient's pain is local, 6-8/10, intermittent and does not radiate to other location, sharp and with some tingling. Can be controlled by pain meds. Dressing in place.  Patient otherwise denies fever, chills, nausea, vomiting, adb pain, SOB, CP, headache, constipation, diarrhea, cough, or sputum.  7/12 I/D again today. Patient otherwise denies fever, chills, nausea, vomiting, adb pain, SOB, CP, headache, constipation, diarrhea, cough, or sputum.  7/13 patient had been stable overnight, tolerated procedure yesterday, patient complained of knee tenderness. Patient's pain is local, 6-8/10, intermittent and does not radiate to other location, sharp and with some tingling. Can be controlled by pain meds. Dressing in place.  Patient wants to go home.  Infectious disease specialist recommend total of 4 weeks of at least antibiotics.Patient otherwise denies fever, chills, nausea, vomiting, adb pain, SOB, CP, headache, constipation, diarrhea, cough, or sputum.  7/14 patient will need PICC line in place, however initially patient refused PICC line.  After prolonged discussion with patient and patient agreed with PICC line insertion.  Patient also complains of not able to shave and wants to have eyeglasses. Patient otherwise denies fever, chills, nausea,  vomiting, adb pain, SOB, CP, headache, constipation, diarrhea, cough, or sputum.  7/15 patient is pleasant today.  Patient is agreeable with PICC line placement.  Patient's wound VAC stopped working last night however surgery indicate patient may be able to get out of wound VAC today.  Patient's pain better controlled today.  Patient denies fever, chills, nausea vomiting diarrhea or shortness of breath.    Consultants/Specialty  Ortho  Id     Disposition  tbd        Review of Systems   Constitutional: Negative for chills, fever and malaise/fatigue.   HENT: Negative for ear discharge.    Eyes: Negative for double vision, photophobia and redness.   Respiratory: Negative for hemoptysis and sputum production.    Cardiovascular: Negative for chest pain and claudication.   Gastrointestinal: Negative for diarrhea, heartburn and vomiting.   Genitourinary: Negative for dysuria, flank pain, frequency and urgency.   Musculoskeletal: Positive for joint pain. Negative for back pain and falls.   Skin: Negative for rash.   Neurological: Negative for dizziness, tingling, sensory change, speech change and focal weakness.   Psychiatric/Behavioral: Negative for depression, substance abuse and suicidal ideas.      Physical Exam  Laboratory/Imaging   Hemodynamics  Temp (24hrs), Av.7 °C (98 °F), Min:36.5 °C (97.7 °F), Max:36.8 °C (98.3 °F)   Temperature: 36.8 °C (98.3 °F)  Pulse  Av.9  Min: 59  Max: 98    Blood Pressure: 133/73 (nurse aware)      Respiratory      Respiration: 16, Pulse Oximetry: 92 %        RUL Breath Sounds: Clear, RML Breath Sounds: Diminished, RLL Breath Sounds: Diminished, FIDELINA Breath Sounds: Clear, LLL Breath Sounds: Diminished    Fluids    Intake/Output Summary (Last 24 hours) at 07/15/18 0830  Last data filed at 07/15/18 0548   Gross per 24 hour   Intake             1180 ml   Output                7 ml   Net             1173 ml       Nutrition  Orders Placed This Encounter   Procedures   • Diet Order  Diabetic     Standing Status:   Standing     Number of Occurrences:   1     Order Specific Question:   Diet:     Answer:   Diabetic [3]     Physical Exam   Constitutional: He is oriented to person, place, and time. No distress.   HENT:   Head: Normocephalic.   Eyes: Conjunctivae and EOM are normal. Left eye exhibits no discharge.   Neck: Normal range of motion. Neck supple. No JVD present. No thyromegaly present.   Cardiovascular: Normal rate, regular rhythm and normal heart sounds.  Exam reveals no gallop and no friction rub.    Pulmonary/Chest: Effort normal and breath sounds normal. No respiratory distress. He has no rales. He exhibits no tenderness.   Abdominal: Soft. He exhibits no distension and no mass. There is no rebound and no guarding.   Musculoskeletal: He exhibits no edema.   Right knee tender to touch, warm to touch, possible infection, status post surgery.   Lymphadenopathy:     He has no cervical adenopathy.   Neurological: He is alert and oriented to person, place, and time. He displays normal reflexes. No cranial nerve deficit.   Skin: Skin is dry. No rash noted. He is not diaphoretic.   Psychiatric: He has a normal mood and affect.   Nursing note and vitals reviewed.      Recent Labs      07/13/18   0326   WBC  6.1   RBC  4.34*   HEMOGLOBIN  13.1*   HEMATOCRIT  40.6*   MCV  93.5   MCH  30.2   MCHC  32.3*   RDW  46.1   PLATELETCT  276   MPV  9.9     Recent Labs      07/13/18   0326   SODIUM  137   POTASSIUM  3.9   CHLORIDE  104   CO2  25   GLUCOSE  158*   BUN  18   CREATININE  0.93   CALCIUM  8.8                      Assessment/Plan     * Infection of right knee (HCC)- (present on admission), right prepatellar septic bursitis   Assessment & Plan    s/p I/D 7/9 and 7/12  Ortho on  Culture pending  ID on, orderPICC line.  Patient initially declined however after prolonged discussion patient agreed currently.  Close monitor  Cont iv abx cefazolin total of 4 weeks antibiotics  Patient has infected  bursitis  PICC line will be placed likely today.        Hyponatremia- (present on admission)   Assessment & Plan    Resolved Possible from hyperglycemia  Also concern of dehydration  IV fluid rehydration finished        Type 2 diabetes mellitus, without long-term current use of insulin (HCC)- (present on admission)   Assessment & Plan    a1c 11.93  DM diet  Lantus 15iu with ISS to cover  Monitor BS  Patient has type 2 diabetes without long-term use of insulin without complication  Patient's blood sugar not controlled with hyperglycemia, however currently better controlled.        Hyperglycemia- (present on admission)   Assessment & Plan    Glucose was 223 in the ER at [facility]  Sliding scale insulin ordered  HbA1c 11.9, treat as abov for DM 2          Quality-Core Measures   Reviewed items::  Labs reviewed, Medications reviewed and Radiology images reviewed  Vivas catheter::  No Vivas  DVT prophylaxis pharmacological::  Enoxaparin (Lovenox)  DVT prophylaxis - mechanical:  SCDs  Antibiotics:  Treating active infection/contamination beyond 24 hours perioperative coverage   For complexity-based billing, please refer to the history, exam, and decison making above. In addition, I spent >35 minutes caring for the patient today. More than 50% of the time was spent counseling and coordinating care.    I have discussed with RN and CM and SW and other consultants about patient's plan.

## 2018-07-16 ENCOUNTER — PATIENT OUTREACH (OUTPATIENT)
Dept: HEALTH INFORMATION MANAGEMENT | Facility: OTHER | Age: 63
End: 2018-07-16

## 2018-07-16 LAB
ANION GAP SERPL CALC-SCNC: 6 MMOL/L (ref 0–11.9)
BASOPHILS # BLD AUTO: 0.3 % (ref 0–1.8)
BASOPHILS # BLD: 0.03 K/UL (ref 0–0.12)
BUN SERPL-MCNC: 11 MG/DL (ref 8–22)
CALCIUM SERPL-MCNC: 9.2 MG/DL (ref 8.5–10.5)
CHLORIDE SERPL-SCNC: 104 MMOL/L (ref 96–112)
CO2 SERPL-SCNC: 26 MMOL/L (ref 20–33)
CREAT SERPL-MCNC: 0.85 MG/DL (ref 0.5–1.4)
EOSINOPHIL # BLD AUTO: 0.12 K/UL (ref 0–0.51)
EOSINOPHIL NFR BLD: 1.4 % (ref 0–6.9)
ERYTHROCYTE [DISTWIDTH] IN BLOOD BY AUTOMATED COUNT: 45.3 FL (ref 35.9–50)
GLUCOSE BLD-MCNC: 128 MG/DL (ref 65–99)
GLUCOSE BLD-MCNC: 166 MG/DL (ref 65–99)
GLUCOSE BLD-MCNC: 169 MG/DL (ref 65–99)
GLUCOSE BLD-MCNC: 176 MG/DL (ref 65–99)
GLUCOSE SERPL-MCNC: 143 MG/DL (ref 65–99)
HCT VFR BLD AUTO: 41.3 % (ref 42–52)
HGB BLD-MCNC: 13.2 G/DL (ref 14–18)
IMM GRANULOCYTES # BLD AUTO: 0.05 K/UL (ref 0–0.11)
IMM GRANULOCYTES NFR BLD AUTO: 0.6 % (ref 0–0.9)
LYMPHOCYTES # BLD AUTO: 1.23 K/UL (ref 1–4.8)
LYMPHOCYTES NFR BLD: 14.3 % (ref 22–41)
MCH RBC QN AUTO: 29.9 PG (ref 27–33)
MCHC RBC AUTO-ENTMCNC: 32 G/DL (ref 33.7–35.3)
MCV RBC AUTO: 93.7 FL (ref 81.4–97.8)
MONOCYTES # BLD AUTO: 0.9 K/UL (ref 0–0.85)
MONOCYTES NFR BLD AUTO: 10.5 % (ref 0–13.4)
NEUTROPHILS # BLD AUTO: 6.28 K/UL (ref 1.82–7.42)
NEUTROPHILS NFR BLD: 72.9 % (ref 44–72)
NRBC # BLD AUTO: 0 K/UL
NRBC BLD-RTO: 0 /100 WBC
PLATELET # BLD AUTO: 279 K/UL (ref 164–446)
PMV BLD AUTO: 10.4 FL (ref 9–12.9)
POTASSIUM SERPL-SCNC: 4.1 MMOL/L (ref 3.6–5.5)
RBC # BLD AUTO: 4.41 M/UL (ref 4.7–6.1)
SODIUM SERPL-SCNC: 136 MMOL/L (ref 135–145)
WBC # BLD AUTO: 8.6 K/UL (ref 4.8–10.8)

## 2018-07-16 PROCEDURE — 80048 BASIC METABOLIC PNL TOTAL CA: CPT

## 2018-07-16 PROCEDURE — 99232 SBSQ HOSP IP/OBS MODERATE 35: CPT | Performed by: INTERNAL MEDICINE

## 2018-07-16 PROCEDURE — 700102 HCHG RX REV CODE 250 W/ 637 OVERRIDE(OP): Performed by: HOSPITALIST

## 2018-07-16 PROCEDURE — 700111 HCHG RX REV CODE 636 W/ 250 OVERRIDE (IP): Performed by: INTERNAL MEDICINE

## 2018-07-16 PROCEDURE — 85025 COMPLETE CBC W/AUTO DIFF WBC: CPT

## 2018-07-16 PROCEDURE — 700102 HCHG RX REV CODE 250 W/ 637 OVERRIDE(OP): Performed by: INTERNAL MEDICINE

## 2018-07-16 PROCEDURE — A9270 NON-COVERED ITEM OR SERVICE: HCPCS | Performed by: HOSPITALIST

## 2018-07-16 PROCEDURE — 302255 BARRIER CREAM MOISTURE BAZA PROTECT (ZINC) 5OZ: Performed by: INTERNAL MEDICINE

## 2018-07-16 PROCEDURE — 770006 HCHG ROOM/CARE - MED/SURG/GYN SEMI*

## 2018-07-16 PROCEDURE — 82962 GLUCOSE BLOOD TEST: CPT | Mod: 91

## 2018-07-16 RX ADMIN — CEFAZOLIN SODIUM 2 G: 2 INJECTION, SOLUTION INTRAVENOUS at 13:50

## 2018-07-16 RX ADMIN — ENOXAPARIN SODIUM 40 MG: 100 INJECTION SUBCUTANEOUS at 05:42

## 2018-07-16 RX ADMIN — STANDARDIZED SENNA CONCENTRATE AND DOCUSATE SODIUM 2 TABLET: 8.6; 5 TABLET, FILM COATED ORAL at 05:42

## 2018-07-16 RX ADMIN — INSULIN HUMAN 3 UNITS: 100 INJECTION, SOLUTION PARENTERAL at 17:37

## 2018-07-16 RX ADMIN — INSULIN GLARGINE 15 UNITS: 100 INJECTION, SOLUTION SUBCUTANEOUS at 17:37

## 2018-07-16 RX ADMIN — INSULIN HUMAN 3 UNITS: 100 INJECTION, SOLUTION PARENTERAL at 12:29

## 2018-07-16 RX ADMIN — CEFAZOLIN SODIUM 2 G: 2 INJECTION, SOLUTION INTRAVENOUS at 05:45

## 2018-07-16 RX ADMIN — CEFAZOLIN SODIUM 2 G: 2 INJECTION, SOLUTION INTRAVENOUS at 22:15

## 2018-07-16 RX ADMIN — INSULIN HUMAN 3 UNITS: 100 INJECTION, SOLUTION PARENTERAL at 21:01

## 2018-07-16 ASSESSMENT — ENCOUNTER SYMPTOMS
CHILLS: 0
PHOTOPHOBIA: 0
EYE PAIN: 0
BACK PAIN: 0
DIZZINESS: 0
SHORTNESS OF BREATH: 0
ABDOMINAL PAIN: 0
WEIGHT LOSS: 0
HEADACHES: 0
CLAUDICATION: 0
CONSTIPATION: 0
FEVER: 0
COUGH: 0
SENSORY CHANGE: 0
SEIZURES: 0
NAUSEA: 0
VOMITING: 0
NECK PAIN: 0
SPUTUM PRODUCTION: 0
DOUBLE VISION: 0
WHEEZING: 0
DEPRESSION: 0
PND: 0
TREMORS: 0
DIARRHEA: 0
HEARTBURN: 0
WEAKNESS: 0
FOCAL WEAKNESS: 0
SPEECH CHANGE: 0
EYE REDNESS: 0
PALPITATIONS: 0
FALLS: 0
FLANK PAIN: 0
HEMOPTYSIS: 0
TINGLING: 0
BLURRED VISION: 0

## 2018-07-16 ASSESSMENT — LIFESTYLE VARIABLES: SUBSTANCE_ABUSE: 0

## 2018-07-16 ASSESSMENT — PAIN SCALES - GENERAL: PAINLEVEL_OUTOF10: 0

## 2018-07-16 NOTE — DISCHARGE PLANNING
"Anticipated Discharge Disposition: Skilled    Action: patient was upset and angry.  After telling him what ID was recommending, patient stated no one has told me this, and \"you folks don't tell me anything.\"  Patient stated he only came her for anti-biotics and he is still here.  Calling his stay here is like being in senior care.  Verified patient does not have a primary, doesn't remember last time he saw a doctor. Patient kept stating that he needs to go home, \" I have shit to deal with.\"  Patient stated people are stealing from him and his phone isn't allowing him to call anyone.  When I told him that unless his ABX is changed to oral we would recommend him going to skilled facility for this.  Patient asked how much this will cost him, explained insurance should pay the majority of it, but not all.  Patient stated he doesn't have the money to pay for anything.  Patient then began stating this is bull shit and he wants to go home today.    Informed Dr. Holloway about patient, requested he consult with ID to see if patient can be changed to oral.     Barriers to Discharge: patient wanting to go home, possible AMA. Skilled acceptance    Plan: continue to monitor for Social Work needs      "

## 2018-07-16 NOTE — PROGRESS NOTES
Seen & examined  Upset about being in the hospital    Vitals:    07/15/18 0711 07/15/18 2015 07/16/18 0432 07/16/18 0735   BP: 133/73 111/55 117/63 116/57   Pulse: 80 79 74 78   Resp: 16 18 18 18   Temp: 36.8 °C (98.3 °F) 37.3 °C (99.2 °F) 36.8 °C (98.3 °F) 36.6 °C (97.8 °F)   SpO2: 92% 92% 90% 91%   Weight:       Height:         RLE:  Dressings removed.  Incision c/d/I.  No drainage or erythema.  Some duskiness to skin edges centrally  Knee immobilizer in place  f/e ankle, toes  Foot w/w/p    POD#7 s/p R prepatella bursectomy, I&D.  Dry aspiration of joint, no TKA involvement at this time.  POD#4 s/p repeat I&D, wound closure    - WBAT in knee immobilizer at all times  - Antibiotics per ID recommendations  - Continue daily dressing care.  Will need to monitor areas of skin edge necrosis.  OK to discharge per orthopaedic perspective.  Needs outpatient follow-up in 1 week for wound check

## 2018-07-16 NOTE — CARE PLAN
Problem: Safety  Goal: Will remain free from injury  Outcome: PROGRESSING AS EXPECTED  Fall precautions in place    Problem: Skin Integrity  Goal: Risk for impaired skin integrity will decrease  Outcome: PROGRESSING SLOWER THAN EXPECTED  Redness on bottom, BRIEN cream applied. Pt educated about turning self and hygiene. Will continue to monitor.

## 2018-07-16 NOTE — CARE PLAN
Problem: Respiratory:  Goal: Respiratory status will improve  Outcome: MET Date Met: 07/15/18

## 2018-07-16 NOTE — PROGRESS NOTES
Infectious Disease Progress Note    Author: Stephanie Best M.D. Date & Time of service: 2018  2:46 PM    Chief Complaint:  Right knee infection    Interval History:  2018 T-max 98.2 WBC 5 platelets 241 feels slightly better  -T-max 98.1 patient had a rotational flap yesterday.  Complains of pain in the knee.  Otherwise doing well.  Had slight shortness of breath.   AF pt c/o not being able to get glasses or a razor, refusing PICC and does not have PCP back in [place]  7/15 AF, no CBC patient with multiple complaints but happy he was able to shave and shower, now amenable to PICC line  2018-T-max 99.2 wants to go home no new issues overnight.  WBC 8.6 platelets 279 creatinine 0.8  Labs Reviewed, Medications Reviewed, Radiology Reviewed and Wound Reviewed.    Review of Systems:  Review of Systems   Constitutional: Positive for malaise/fatigue. Negative for chills and fever.   Respiratory: Negative for cough and shortness of breath.    Cardiovascular: Negative for chest pain.   Gastrointestinal: Negative for abdominal pain, nausea and vomiting.   Genitourinary: Negative for dysuria.   Musculoskeletal: Positive for joint pain.        Right knee hurts   Neurological: Negative for sensory change and speech change.       Hemodynamics:  Temp (24hrs), Av.9 °C (98.4 °F), Min:36.6 °C (97.8 °F), Max:37.3 °C (99.2 °F)  Temperature: 36.6 °C (97.8 °F)  Pulse  Av.1  Min: 59  Max: 98   Blood Pressure: 116/57       Physical Exam:  Physical Exam   Constitutional: He is oriented to person, place, and time. He appears well-developed. No distress.   Obese   HENT:   Head: Normocephalic and atraumatic.   Mouth/Throat: No oropharyngeal exudate.   Eyes: EOM are normal. Pupils are equal, round, and reactive to light. No scleral icterus.   Neck: Neck supple.   Cardiovascular: Normal rate and regular rhythm.    No murmur heard.  Pulmonary/Chest: Effort normal. He has no wheezes. He has no rales.   Abdominal: Soft.  There is no tenderness.   Musculoskeletal: He exhibits edema.   Right knee in surgical dressing + Valerie VAC   Neurological: He is alert and oriented to person, place, and time. No cranial nerve deficit. Coordination normal.   Psychiatric:   Irritable   Vitals reviewed.      Meds:    Current Facility-Administered Medications:   •  ceFAZolin  •  insulin glargine  •  enoxaparin (LOVENOX) injection  •  senna-docusate **AND** polyethylene glycol/lytes **AND** magnesium hydroxide **AND** bisacodyl  •  ondansetron  •  ondansetron  •  promethazine  •  promethazine  •  prochlorperazine  •  insulin regular **AND** Accu-Chek ACHS **AND** NOTIFY MD and PharmD **AND** glucose 4 g **AND** dextrose 50%  •  acetaminophen  •  oxyCODONE immediate-release  •  morphine injection    Labs:  Recent Labs      07/16/18   0817   WBC  8.6   RBC  4.41*   HEMOGLOBIN  13.2*   HEMATOCRIT  41.3*   MCV  93.7   MCH  29.9   RDW  45.3   PLATELETCT  279   MPV  10.4   NEUTSPOLYS  72.90*   LYMPHOCYTES  14.30*   MONOCYTES  10.50   EOSINOPHILS  1.40   BASOPHILS  0.30     Recent Labs      07/16/18   0817   SODIUM  136   POTASSIUM  4.1   CHLORIDE  104   CO2  26   GLUCOSE  143*   BUN  11     Recent Labs      07/16/18   0817   CREATININE  0.85       Imaging:  Dx-knee 2- Right    Result Date: 7/9/2018 7/9/2018 4:35 PM HISTORY/REASON FOR EXAM: Anterior knee pain without injury. TECHNIQUE/EXAM DESCRIPTION AND NUMBER OF VIEWS:  2 views of the right knee. COMPARISON: None FINDINGS: Anterior to the patella there is soft tissue swelling and gas. Subjacent edema Total knee arthroplasty with patellar resurfacing has been performed.  The tibial component is cemented. There is also cement anterior to the tibial tubercle/proximal tibia No periprosthetic fracture is detected. There is question of lucency underlying the medial margin of the tibial component Ossification seen superomedial to the femur is likely post traumatic     Prepatellar soft tissue swelling and gas  is compatible with infection and/or laceration. No gross intra-articular extension is seen Total knee arthroplasty. Lucency underlying the medial margin of the tibia could be chronic or secondary to loosening. Comparison with prior x-rays would prove helpful       Micro:  Results     Procedure Component Value Units Date/Time    CULTURE TISSUE W/ GRM STAIN [[id number]]  (Abnormal) Collected:  07/09/18 1956    Order Status:  Completed Specimen:  Tissue Updated:  07/12/18 1455     Significant Indicator POS (POS)     Source TISS     Site Right Knee Tissue     Tissue Culture -- (A)     Gram Stain Result Few WBCs.  No organisms seen.       Tissue Culture Streptococcus agalactiae (Group B)  Moderate growth   (A)    ANAEROBIC CULTURE [[id number]] Collected:  07/09/18 1956    Order Status:  Completed Specimen:  Tissue Updated:  07/12/18 1455     Significant Indicator NEG     Source TISS     Site Right Knee Tissue     Anaerobic Culture, Culture Res No Anaerobes isolated.    AFB CULTURE [[id number]] Collected:  07/09/18 1956    Order Status:  Completed Specimen:  Tissue Updated:  07/12/18 1455     Significant Indicator NEG     Source TISS     Site Right Knee Tissue     AFB Culture Culture in progress.     AFB Smear Results No acid fast bacilli seen.    FUNGAL CULTURE [[id number]] Collected:  07/09/18 1956    Order Status:  Completed Specimen:  Tissue Updated:  07/12/18 1455     Significant Indicator NEG     Source TISS     Site Right Knee Tissue     Fungal Culture Culture in progress.    CULTURE WOUND W/ GRAM STAIN [[id number]]  (Abnormal)  (Susceptibility) Collected:  07/09/18 1550    Order Status:  Completed Specimen:  Wound Updated:  07/12/18 0841     Significant Indicator POS (POS)     Source WND     Site Right Leg     Culture Result Wound -- (A)     Gram Stain Result Many WBCs.  Rare Gram positive cocci.       Culture Result Wound Streptococcus agalactiae (Group B)  Moderate growth   (A)      Staphylococcus aureus  Light  growth   (A)    Culture & Susceptibility     STAPHYLOCOCCUS AUREUS     Antibiotic Sensitivity Microscan Unit Status    Ampicillin/sulbactam Resistant <=8/4 mcg/mL Final    Method: SENSITIVITY, ANTOINETTE    Clindamycin Sensitive <=0.5 mcg/mL Final    Method: SENSITIVITY, ANTOINETTE    Daptomycin Sensitive 1 mcg/mL Final    Method: SENSITIVITY, ANTOINETTE    Erythromycin Resistant >4 mcg/mL Final    Method: SENSITIVITY, ANTOINETTE    Moxifloxacin Sensitive 1 mcg/mL Final    Method: SENSITIVITY, ANTOINETTE    Oxacillin Resistant 2 mcg/mL Final    Method: SENSITIVITY, ANTOINETTE    Tetracycline Sensitive <=4 mcg/mL Final    Method: SENSITIVITY, ANTOINETTE    Trimeth/Sulfa Sensitive <=0.5/9.5 mcg/mL Final    Method: SENSITIVITY, ANTOINETTE    Vancomycin Sensitive 1 mcg/mL Final    Method: SENSITIVITY, ANTOINETTE                       GRAM STAIN [[id number]] Collected:  07/09/18 1956    Order Status:  Completed Specimen:  Tissue Updated:  07/10/18 2017     Significant Indicator .     Source TISS     Site Right Knee Tissue     Gram Stain Result Few WBCs.  No organisms seen.      ACID FAST STAIN [[id number]] Collected:  07/09/18 1956    Order Status:  Completed Specimen:  Tissue Updated:  07/10/18 2017     Significant Indicator NEG     Source TISS     Site Right Knee Tissue     AFB Smear Results No acid fast bacilli seen.    GRAM STAIN [[id number]] Collected:  07/09/18 1550    Order Status:  Completed Specimen:  Wound Updated:  07/09/18 2156     Significant Indicator .     Source WND     Site Right Leg     Gram Stain Result Many WBCs.  Rare Gram positive cocci.      CULTURE WOUND W/O GRAM STAIN [[id number]]     Order Status:  No result Specimen:  Wound from Right Leg           Assessment:  Active Hospital Problems    Diagnosis   • *Infection of right knee ([facility]) [M00.9]   • Type 2 diabetes mellitus, without long-term current use of insulin ([facility]) [E11.9]   • Hyponatremia [E87.1]   • Hyperglycemia [R73.9]       Plan:  Right prepatellar septic bursitis  Patient has a prosthetic knee in  place  Underwent I&D on 7/9/2018  Underwent rotational flap on 7/12  Cultures for group B strep and staph aureus  Continue Ancef  PICC line placed  Patient wants to go home and there are issues with IV antibiotic.  In case patient wants to sign out AMA then send him home on high-dose Bactrim and Augmentin.  Patient understands at this time IV antibiotics are his best bet  Plan for 6 weeks weeks from 7/9 due to concern for possible hardware infection  Stop date 08/20/18    Newly diagnosed diabetes mellitus  HgA1c 11.9  Keep blood sugars less than 150 for wound healing and to control infection    Difficult discharge. Pt is from [place] and has no PCP but expects to return home for intravenous antibiotic    Discussed with internal medicine/ Dr. Holloway

## 2018-07-16 NOTE — CARE PLAN
Problem: Knowledge Deficit  Goal: Knowledge of disease process/condition, treatment plan, diagnostic tests, and medications will improve  Outcome: MET Date Met: 07/15/18

## 2018-07-16 NOTE — PROGRESS NOTES
"Pt is irritable this morning. Stating \"I feel like I am in shelter, rather than a hospital\". Pt was educated about plan of care. Pt was also assisted to the chair for his meal. Redness was noted on his bottom and groin area, barrier cream applied. Pt refused to wear his knee immobilizer. Dressing is intact on right knee. Some old drainage noted. PICC line is C/D/I. Will continue to educate and monitor pt.   "

## 2018-07-17 VITALS
BODY MASS INDEX: 34.04 KG/M2 | WEIGHT: 251.32 LBS | OXYGEN SATURATION: 93 % | RESPIRATION RATE: 20 BRPM | TEMPERATURE: 97.5 F | HEIGHT: 72 IN | HEART RATE: 69 BPM | DIASTOLIC BLOOD PRESSURE: 59 MMHG | SYSTOLIC BLOOD PRESSURE: 127 MMHG

## 2018-07-17 LAB
GLUCOSE BLD-MCNC: 119 MG/DL (ref 65–99)
GLUCOSE BLD-MCNC: 125 MG/DL (ref 65–99)

## 2018-07-17 PROCEDURE — 700111 HCHG RX REV CODE 636 W/ 250 OVERRIDE (IP): Performed by: INTERNAL MEDICINE

## 2018-07-17 PROCEDURE — 99239 HOSP IP/OBS DSCHRG MGMT >30: CPT | Performed by: HOSPITALIST

## 2018-07-17 PROCEDURE — 82962 GLUCOSE BLOOD TEST: CPT | Mod: 91

## 2018-07-17 RX ORDER — SULFAMETHOXAZOLE AND TRIMETHOPRIM 800; 160 MG/1; MG/1
1 TABLET ORAL EVERY 8 HOURS
Status: DISCONTINUED | OUTPATIENT
Start: 2018-07-17 | End: 2018-07-17 | Stop reason: HOSPADM

## 2018-07-17 RX ORDER — AMOXICILLIN 500 MG/1
1000 CAPSULE ORAL EVERY 8 HOURS
Status: DISCONTINUED | OUTPATIENT
Start: 2018-07-17 | End: 2018-07-17 | Stop reason: HOSPADM

## 2018-07-17 RX ORDER — BUTALBITAL, ACETAMINOPHEN AND CAFFEINE 50; 325; 40 MG/1; MG/1; MG/1
TABLET ORAL
Status: DISCONTINUED
Start: 2018-07-17 | End: 2018-07-17

## 2018-07-17 RX ORDER — AMOXICILLIN AND CLAVULANATE POTASSIUM 875; 125 MG/1; MG/1
1 TABLET, FILM COATED ORAL 2 TIMES DAILY
Qty: 52 TAB | Refills: 0 | Status: SHIPPED | OUTPATIENT
Start: 2018-07-17 | End: 2018-08-12

## 2018-07-17 RX ORDER — SULFAMETHOXAZOLE AND TRIMETHOPRIM 800; 160 MG/1; MG/1
1 TABLET ORAL 2 TIMES DAILY
Qty: 52 TAB | Refills: 0 | Status: SHIPPED | OUTPATIENT
Start: 2018-07-17 | End: 2018-08-12

## 2018-07-17 RX ADMIN — CEFAZOLIN SODIUM 2 G: 2 INJECTION, SOLUTION INTRAVENOUS at 05:30

## 2018-07-17 RX ADMIN — ENOXAPARIN SODIUM 40 MG: 100 INJECTION SUBCUTANEOUS at 04:48

## 2018-07-17 NOTE — PROGRESS NOTES
Seen & examined  No new complaints  Discharging home on oral antibiotics despite recommendations for IV    Vitals:    07/16/18 1527 07/16/18 1900 07/17/18 0400 07/17/18 0735   BP: 112/58 104/67 108/72 127/59   Pulse: 75 79 75 69   Resp: 20 16 18 20   Temp: 36.9 °C (98.5 °F) 37.1 °C (98.8 °F) 36.9 °C (98.5 °F) 36.4 °C (97.5 °F)   SpO2: 92% 92% 92% 93%   Weight:       Height:         RLE:  Dressings removed.  Incision c/d/I.  No drainage or erythema.  Central skin necrosis present  Knee immobilizer in place  f/e ankle, toes  Foot w/w/p    POD#8 s/p R prepatella bursectomy, I&D.  Dry aspiration of joint, no TKA involvement at this time.  POD#5 s/p repeat I&D, wound closure    - WBAT in charles brace locked in extension  - Antibiotics per ID recommendations  - Continue daily dressing care.  Will need to monitor area of central skin necrosis.  He understands he may need another surgery depending on how this demarcates.  OK to discharge per orthopaedic perspective.  Needs outpatient follow-up on Monday, 7/23, with Dr Justin for wound check

## 2018-07-17 NOTE — DISCHARGE INSTRUCTIONS
Discharge Instructions    Discharged to home by car with self. Discharged via wheelchair, hospital escort: Yes.  Special equipment needed: Immobilizer    Be sure to schedule a follow-up appointment with your primary care doctor or any specialists as instructed.     Discharge Plan:   Pneumococcal Vaccine Administered/Refused: Not given - Patient refused pneumococcal vaccine  Influenza Vaccine Indication: Patient Refuses    I understand that a diet low in cholesterol, fat, and sodium is recommended for good health. Unless I have been given specific instructions below for another diet, I accept this instruction as my diet prescription.   Other diet: Diabetic    Special Instructions: Discharge instructions for the Orthopedic Patient    Follow up with Primary Care Physician within 2 weeks of discharge to home, regarding:  Review of medications and diagnostic testing.  Surveillance for medical complications.  Workup and treatment of osteoporosis, if appropriate.     -Is this a Joint Replacement patient? No    -Is this patient being discharged with medication to prevent blood clots?  No    · Is patient discharged on Warfarin / Coumadin?   No         Incision and Drainage, Care After  Refer to this sheet in the next few weeks. These instructions provide you with information on caring for yourself after your procedure. Your caregiver may also give you more specific instructions. Your treatment has been planned according to current medical practices, but problems sometimes occur. Call your caregiver if you have any problems or questions after your procedure.  HOME CARE INSTRUCTIONS   If antibiotic medicine is given, take it as directed. Finish it even if you start to feel better.  Only take over-the-counter or prescription medicines for pain, discomfort, or fever as directed by your caregiver.  Keep all follow-up appointments as directed by your caregiver.  Change any bandages (dressings) as directed by your caregiver.  Replace old dressings with clean dressings.  Wash your hands before and after caring for your wound.  You will receive specific instructions for cleansing and caring for your wound.   SEEK MEDICAL CARE IF:   You have increased pain, swelling, or redness around the wound.  You have increased drainage, smell, or bleeding from the wound.  You have muscle aches, chills, or you feel generally sick.  You have a fever.  MAKE SURE YOU:   Understand these instructions.  Will watch your condition.  Will get help right away if you are not doing well or get worse.     This information is not intended to replace advice given to you by your health care provider. Make sure you discuss any questions you have with your health care provider.     Document Released: 03/11/2013 Document Revised: 01/08/2016 Document Reviewed: 03/11/2013  [employer] Interactive Patient Education ©2016 [employer]    Cellulitis, Adult  Introduction  Cellulitis is a skin infection. The infected area is usually red and sore. This condition occurs most often in the arms and lower legs. It is very important to get treated for this condition.  Follow these instructions at home:  · Take over-the-counter and prescription medicines only as told by your doctor.  · If you were prescribed an antibiotic medicine, take it as told by your doctor. Do not stop taking the antibiotic even if you start to feel better.  · Drink enough fluid to keep your pee (urine) clear or pale yellow.  · Do not touch or rub the infected area.  · Raise (elevate) the infected area above the level of your heart while you are sitting or lying down.  · Place warm or cold wet cloths (warm or cold compresses) on the infected area. Do this as told by your doctor.  · Keep all follow-up visits as told by your doctor. This is important. These visits let your doctor make sure your infection is not getting worse.  Contact a doctor if:  · You have a fever.  · Your symptoms do not get better after 1-2  days of treatment.  · Your bone or joint under the infected area starts to hurt after the skin has healed.  · Your infection comes back. This can happen in the same area or another area.  · You have a swollen bump in the infected area.  · You have new symptoms.  · You feel ill and also have muscle aches and pains.  Get help right away if:  · Your symptoms get worse.  · You feel very sleepy.  · You throw up (vomit) or have watery poop (diarrhea) for a long time.  · There are red streaks coming from the infected area.  · Your red area gets larger.  · Your red area turns darker.  This information is not intended to replace advice given to you by your health care provider. Make sure you discuss any questions you have with your health care provider.  Document Released: 06/05/2009 Document Revised: 05/25/2017 Document Reviewed: 10/26/2016  © 2017 [employer]      Depression / Suicide Risk    As you are discharged from this [facility] facility, it is important to learn how to keep safe from harming yourself.    Recognize the warning signs:  · Abrupt changes in personality, positive or negative- including increase in energy   · Giving away possessions  · Change in eating patterns- significant weight changes-  positive or negative  · Change in sleeping patterns- unable to sleep or sleeping all the time   · Unwillingness or inability to communicate  · Depression  · Unusual sadness, discouragement and loneliness  · Talk of wanting to die  · Neglect of personal appearance   · Rebelliousness- reckless behavior  · Withdrawal from people/activities they love  · Confusion- inability to concentrate     If you or a loved one observes any of these behaviors or has concerns about self-harm, here's what you can do:  · Talk about it- your feelings and reasons for harming yourself  · Remove any means that you might use to hurt yourself (examples: pills, rope, extension cords, firearm)  · Get professional help from the community (Mental  Health, Substance Abuse, psychological counseling)  · Do not be alone:Call your Safe Contact- someone whom you trust who will be there for you.  · Call your local CRISIS HOTLINE 459-5500 or 843-092-9678  · Call your local Children's Mobile Crisis Response Team [place] (816) 057-4279 or www.Comply365  · Call the toll free National Suicide Prevention Hotlines   · National Suicide Prevention Lifeline 209-139-RZNX (6525)  · National Hope Line Network 800-SUICIDE (278-8641)

## 2018-07-17 NOTE — PROGRESS NOTES
Patient discharged at 1625 with self via taxi home. Refused escort out of hospital. IV sites discontinued, catheter tips intact. Belongings returned to patient from safe keeping. Car keys missing but were not placed in safe keeping. Charge RN notified and description of keys taken down. Discharge instructions given, including importance of follow up appointments and antibiotic prescriptions. Discharged with leg brace.

## 2018-07-17 NOTE — DISCHARGE PLANNING
Anticipated Discharge Disposition: skilled/out patient infusion, wound care    Action: PC to [facility]: [phone]: told that last time patient was seen was in 2012 and he will need to be re-established as a new patient. Referred to another clinic closer to his home,   Barriers to Discharge: ***    Plan: ***

## 2018-07-17 NOTE — PROGRESS NOTES
Patient alert/oriented,fall risk,refused bed alarm despite education,call light and personal belongings within reach,bed in low position and hourly rounding in placed

## 2018-07-17 NOTE — DISCHARGE SUMMARY
Discharge Summary    CHIEF COMPLAINT ON ADMISSION  No chief complaint on file.      Reason for Admission  rt knee cellulitis     Admission Date  7/9/2018    CODE STATUS  Full Code    HPI & HOSPITAL COURSE  This is a 62 y.o. male here with right knee cellulitis and infected prepatellar bursa.  He underwent irrigation debridement with aspiration and wound VAC placement on 7/9/2018.  He had repeat I&D on 7/12/2018.  Cultures grew MSSA and group B strep. Infectious disease was consulted who recommended 4 weeks at least of antibiotics, and he was continued on IV antibiotics during hospitalization.  The worry was for knee hardware infection. PICC line was placed.  Patient was challenging to her hospitalization, consistently demanding to be discharged home however patient was without PCP and no clear discharge follow-up plan.  On 716 patient's wound VAC was discontinued by orthopedics and he was cleared from their services for discharge home.  On 7/17/2018 patient's care was discussed with infectious disease and social work, continued IV antibiotics are recommended however patient was still demanding to be discharged home and would need to sign out AMA.  Discussed with infectious disease, Augmentin and Bactrim will be fine for p.o. coverage, however discharge process was difficult given patient's sometimes aggressive and circuitous conversation - as mentioned above, he refused to use IV abx as outpatient but then when given the AMA paperwork, he refused to sign these as well.    I discussed this at length with patient, he's agreed to discharge 'normally' with the PO antibiotics, which he's assured me he will finish the complete course of. I've advised him that failing to do so may be catastrophic for his knee and potentially could lead to sepsis and death. He is aware of the complications that may ensue.      Therefore, he is discharged in fair and stable condition to home with close outpatient follow-up.    The patient  met 2-midnight criteria for an inpatient stay at the time of discharge.    Discharge Date  7/17/2018    FOLLOW UP ITEMS POST DISCHARGE  Patiently to follow-up with orthopedics.  Recommended patient obtain new PCP as well.    DISCHARGE DIAGNOSES  Principal Problem:    Prepatellar bursitis of right knee POA: Yes  Active Problems:    Hyperglycemia POA: Yes    Type 2 diabetes mellitus, without long-term current use of insulin (HCC) POA: Yes    Hyponatremia POA: Yes  Resolved Problems:    * No resolved hospital problems. *      FOLLOW UP  No future appointments.  David Travis M.D.  [address]  [place] [address]  [phone]      The Urology office requests that you call after you are discharged to schedule a new patient appointment.    [facility]  [phone]    If you decide you would like to establish with a primary care provider please call us to schedule a new patient appointment.     Riley Justin M.D.  [address] [facility]  [place] [address]  [phone]      The Orthopedic office will call you directly to schedule a 1 week follow up for wound re-check. If you do not receive a call within 2 days please call to arrange follow up.    Pcp Pt States None            MEDICATIONS ON DISCHARGE     Medication List      START taking these medications      Instructions   amoxicillin-clavulanate 875-125 MG Tabs  Commonly known as:  AUGMENTIN   Take 1 Tab by mouth 2 times a day for 26 days.  Dose:  1 Tab     sulfamethoxazole-trimethoprim 800-160 MG tablet  Commonly known as:  BACTRIM DS   Take 1 Tab by mouth 2 times a day for 26 days.  Dose:  1 Tab            Allergies  No Known Allergies    DIET  Orders Placed This Encounter   Procedures   • Diet Order Diabetic     Standing Status:   Standing     Number of Occurrences:   1     Order Specific Question:   Diet:     Answer:   Diabetic [3]       ACTIVITY  As tolerated.  Weight bearing as tolerated    CONSULTATIONS  Infectious  disease  Orthopedics    PROCEDURES  7/9/2018  1.  Irrigation and debridement.  2.  Dry aspiration of right knee.  3.  Application of wound VAC.    7/12/2018  1.  Rotational flap coverage.  2.  Application of wound VAC.    LABORATORY  Lab Results   Component Value Date    SODIUM 136 07/16/2018    POTASSIUM 4.1 07/16/2018    CHLORIDE 104 07/16/2018    CO2 26 07/16/2018    GLUCOSE 143 (H) 07/16/2018    BUN 11 07/16/2018    CREATININE 0.85 07/16/2018        Lab Results   Component Value Date    WBC 8.6 07/16/2018    HEMOGLOBIN 13.2 (L) 07/16/2018    HEMATOCRIT 41.3 (L) 07/16/2018    PLATELETCT 279 07/16/2018        Total time of the discharge process exceeds 55 minutes.

## 2018-07-17 NOTE — PROGRESS NOTES
Hinged knee brace was delivered and fitted to patient LLE (fully extended and locked).  If any further assistance needed, please call extension [phone] or place order for Ortho Technician assistance as a communication order in [facility].

## 2018-07-23 ENCOUNTER — OFFICE VISIT (OUTPATIENT)
Dept: MEDICAL GROUP | Facility: PHYSICIAN GROUP | Age: 63
End: 2018-07-23
Payer: COMMERCIAL

## 2018-07-23 VITALS
OXYGEN SATURATION: 94 % | RESPIRATION RATE: 16 BRPM | DIASTOLIC BLOOD PRESSURE: 86 MMHG | WEIGHT: 248 LBS | HEIGHT: 71 IN | SYSTOLIC BLOOD PRESSURE: 140 MMHG | HEART RATE: 74 BPM | TEMPERATURE: 98.3 F | BODY MASS INDEX: 34.72 KG/M2

## 2018-07-23 DIAGNOSIS — M70.41 PREPATELLAR BURSITIS OF RIGHT KNEE: ICD-10-CM

## 2018-07-23 DIAGNOSIS — E11.00 TYPE 2 DIABETES MELLITUS WITH HYPEROSMOLARITY WITHOUT COMA, WITHOUT LONG-TERM CURRENT USE OF INSULIN (HCC): ICD-10-CM

## 2018-07-23 LAB
HBA1C MFR BLD: 10.7 % (ref ?–5.8)
INT CON NEG: NEGATIVE
INT CON POS: POSITIVE

## 2018-07-23 PROCEDURE — 83036 HEMOGLOBIN GLYCOSYLATED A1C: CPT | Performed by: NURSE PRACTITIONER

## 2018-07-23 PROCEDURE — 99204 OFFICE O/P NEW MOD 45 MIN: CPT | Performed by: NURSE PRACTITIONER

## 2018-07-23 ASSESSMENT — PAIN SCALES - GENERAL: PAINLEVEL: 2=MINIMAL-SLIGHT

## 2018-07-23 NOTE — ASSESSMENT & PLAN NOTE
This is a new diagnosis from the hospital. a1c is 10.7.  Patient was using insulin in the hospital.  States that he wants to try diet modifications and does not want to start medications at this time.  Patient denies hypoglycemic episodes.  Refuses to check blood sugars at this time.  To start patient on Metformin 500 mg twice daily discussed increasing this to 1000 mg twice daily over the next month.  Patient denies numbness or tingling in his feet.  Consult regarding important diet changes including decrease carbs, counseled patient regarding sources of carbs including fruits, sugars including donuts, sodas, breads.  Patient refuses to cook.  Patient is unlikely to start medication despite recommendation counseled patient that with an A1c of 10.7 I would strongly recommend starting medication immediately.

## 2018-07-23 NOTE — ASSESSMENT & PLAN NOTE
He is status post I&D on July 12.  Continues to have redness, swelling, heat to touch around large open area, stitches are in place and incisions above and below open area do appear healed stitches are embedded in the wound, this provider was able to remove one stitch intact.  Concern with inability to find both ends of sutures discussed with patient that I would recommend removal by Dr. Justin or seeing someone in the wound clinic for possible removal.  Patient's culture in the hospital was positive for MSSA, group B strep.  Patient was recommended to stay in the hospital for continued IV antibiotics patient refuses and was released from the hospital with Augmentin, Bactrim ×4 weeks.  States that he is continuing to take antibiotics as prescribed.  States that redness, inflammation has improved.  Patient has not followed up with wound care and per conversation with Dr. Justin medical assistant was supposed to have followed up 7 days after release.  Patient denies fever, chills, nausea, vomiting, general malaise.  Strict ER precautions given to patient regarding signs and symptoms of infection including worsening swelling, increasing pain, fever, chills, increased of red area around the knee, horizontally across the patella open areas approximately 3 inches, redness encompasses most of the anterior knee.  Per conversation with Dr. Justin's MA they will get patient into the clinic for evaluation on Wednesday 7/25/18.  Patient is amenable to this plan and states that he will stay in town.

## 2018-07-23 NOTE — PROGRESS NOTES
Chief Complaint   Patient presents with   • Establish Care     New Patient        HISTORY OF THE PRESENT ILLNESS: This is a 62 y.o. male new patient to our practice. This pleasant patient is here today to discuss diabetes and knee cellulitis.  Patient states that this was related to black  bite.    Type 2 diabetes mellitus, without long-term current use of insulin (HCC)  This is a new diagnosis from the hospital. a1c is 10.7.  Patient was using insulin in the hospital.  States that he wants to try diet modifications and does not want to start medications at this time.  Patient denies hypoglycemic episodes.  Refuses to check blood sugars at this time.  To start patient on Metformin 500 mg twice daily discussed increasing this to 1000 mg twice daily over the next month.  Patient denies numbness or tingling in his feet.  Consult regarding important diet changes including decrease carbs, counseled patient regarding sources of carbs including fruits, sugars including donuts, sodas, breads.  Patient refuses to cook.  Patient is unlikely to start medication despite recommendation counseled patient that with an A1c of 10.7 I would strongly recommend starting medication immediately.    Prepatellar bursitis of right knee  He is status post I&D on July 12.  Continues to have redness, swelling, heat to touch around large open area, stitches are in place and incisions above and below open area do appear healed stitches are embedded in the wound, this provider was able to remove one stitch intact.  Concern with inability to find both ends of sutures discussed with patient that I would recommend removal by Dr. Justin or seeing someone in the wound clinic for possible removal.  Patient's culture in the hospital was positive for MSSA, group B strep.  Patient was recommended to stay in the hospital for continued IV antibiotics patient refuses and was released from the hospital with Augmentin, Bactrim ×4 weeks.  States that he is  continuing to take antibiotics as prescribed.  States that redness, inflammation has improved.  Patient has not followed up with wound care and per conversation with Dr. Justin medical assistant was supposed to have followed up 7 days after release.  Patient denies fever, chills, nausea, vomiting, general malaise.  Strict ER precautions given to patient regarding signs and symptoms of infection including worsening swelling, increasing pain, fever, chills, increased of red area around the knee, horizontally across the patella open areas approximately 3 inches, redness encompasses most of the anterior knee.  Per conversation with Dr. Justin's MA they will get patient into the clinic for evaluation on Wednesday 7/25/18.  Patient is amenable to this plan and states that he will stay in town.      Past Medical History:   Diagnosis Date   • Black  spider bite        Past Surgical History:   Procedure Laterality Date   • IRRIGATION & DEBRIDEMENT ORTHO Right 7/12/2018    Procedure: IRRIGATION & DEBRIDEMENT ORTHO-wound vac placement;  Surgeon: Riley Justin M.D.;  Location: SURGERY [facility];  Service: Orthopedics   • KNEE ARTHROPLASTY TOTAL  7/9/2018    Procedure: Irrigation and Debridement of Right Patellar Bursa , with wound Vac Application;  Surgeon: Riley Justin M.D.;  Location: SURGERY [facility];  Service: Orthopedics       Family Status   Relation Status   • Father    • Paternal Uncle      Family History   Problem Relation Age of Onset   • Heart Disease Father      MI, hx rheum   • Heart Disease Paternal Uncle        Social History   Substance Use Topics   • Smoking status: Passive Smoke Exposure - Never Smoker   • Smokeless tobacco: Former User     Types: Snuff     Quit date: 7/23/2000   • Alcohol use 1.8 - 3.0 oz/week     3 - 5 Shots of liquor per week       Allergies: Patient has no known allergies.    Current Outpatient Prescriptions Ordered in [facility]   Medication Sig Dispense Refill   • metFORMIN  "(GLUCOPHAGE) 500 MG Tab Take 1 Tab by mouth 2 times a day, with meals. 180 Tab 0   • amoxicillin-clavulanate (AUGMENTIN) 875-125 MG Tab Take 1 Tab by mouth 2 times a day for 26 days. 52 Tab 0   • sulfamethoxazole-trimethoprim (BACTRIM DS) 800-160 MG tablet Take 1 Tab by mouth 2 times a day for 26 days. 52 Tab 0     No current Epic-ordered facility-administered medications on file.        Review of Systems   Constitutional:  Negative for fever, chills, weight loss and malaise/fatigue.   HENT:  Negative for ear pain, nosebleeds, congestion, sore throat and neck pain.    Eyes:  Negative for blurred vision.   Respiratory:  Negative for cough, sputum production, shortness of breath and wheezing.    Cardiovascular:  Negative for chest pain, palpitations, orthopnea and leg swelling.   Gastrointestinal:  Negative for heartburn, nausea, vomiting and abdominal pain.   Genitourinary:  Negative for dysuria, urgency and frequency.   Musculoskeletal:  Negative for myalgias, back pain and positive joint pain.   Skin:  Negative for rash and itching.   Neurological:  Negative for dizziness, tingling, tremors, sensory change, focal weakness and headaches.   Psychiatric/Behavioral:  Negative for depression, anxiety, or memory loss.     All other systems reviewed and are negative except as in HPI.    Exam: Blood pressure 140/86, pulse 74, temperature 36.8 °C (98.3 °F), resp. rate 16, height 1.803 m (5' 11\"), weight 112.5 kg (248 lb), SpO2 94 %.  General:  Normal appearing. No distress.  Pulmonary:  Clear to ausculation.  Normal effort. No rales, ronchi, or wheezing.  Cardiovascular:  Regular rate and rhythm without murmur. Carotid and radial pulses are intact and equal bilaterally.  Neurologic:  Grossly nonfocal  Skin:  Warm and dry.  Large open area on right knee, eschar is present, redness and swelling noted, there is heat to touch, incision superior and inferior to larger open area is CDI.   musculoskeletal:  Normal gait. No " extremity cyanosis, clubbing, or edema.  Psych:  Normal mood and affect. Alert and oriented x3. Judgment and insight is normal.    PLAN:    1. Type 2 diabetes mellitus with hyperosmolarity without coma, without long-term current use of insulin (HCC)  A1c is 10.7  Patient states that he does not wish to start medication.  Patient would like to wait to start medication discussed with patient and I strongly recommend starting medication at this time.  Discussed with patient that I would request 1 month follow-up with myself and the diabetes nurse discussed with patient that I would recommend referral for diabetes education.  - POCT  A1C  - metFORMIN (GLUCOPHAGE) 500 MG Tab; Take 1 Tab by mouth 2 times a day, with meals.  Dispense: 180 Tab; Refill: 0  - REFERRAL TO DIABETIC EDUCATION Diabetes Self Management Education / Training (DSME/T) and Medical Nutrition Therapy (MNT): Initial Group DSME/MNT as authorized by payor; DSME/T Content: Monitoring Diabetes, Diabetes as disease process, Psychologic...    2. Prepatellar bursitis of right knee  He is referred to wound clinic at this time discussed with patient reasons to be seen in the ER immediately including increased signs and symptoms of infection which are gone over in detail.  As noted previously patient will be following up with Dr. Justin's office on Wednesday, per my conversation this evening with his medical assistant.  - REFERRAL TO WOUND CLINIC    Follow-up in 1 month or sooner as needed follow-up in the ER for increasing redness, swelling, fever, chills or any exudate from the wound. Patient is encouraged to be seen in the emergency room for chest pain, palpitations, shortness of breath, dizziness, severe abdominal pain or other concerning symptoms.    Please note that this dictation was created using voice recognition software. I have made every reasonable attempt to correct obvious errors, but I expect that there are errors of grammar and possibly content that  I did not discover before finalizing the note.      Assessment/Plan  1. Type 2 diabetes mellitus with hyperosmolarity without coma, without long-term current use of insulin (HCC)  POCT  A1C    metFORMIN (GLUCOPHAGE) 500 MG Tab    REFERRAL TO DIABETIC EDUCATION Diabetes Self Management Education / Training (DSME/T) and Medical Nutrition Therapy (MNT): Initial Group DSME/MNT as authorized by payor; DSME/T Content: Monitoring Diabetes, Diabetes as disease process, Psychologic...   2. Prepatellar bursitis of right knee  REFERRAL TO WOUND CLINIC         I have placed the below orders and discussed them with an approved delegating provider. The MA is performing the below orders under the direction of Dr. Castillo.

## 2018-07-24 ENCOUNTER — TELEPHONE (OUTPATIENT)
Dept: MEDICAL GROUP | Facility: PHYSICIAN GROUP | Age: 63
End: 2018-07-24

## 2018-07-24 LAB
BACTERIA WND AEROBE CULT: ABNORMAL
GRAM STN SPEC: ABNORMAL
SIGNIFICANT IND 70042: ABNORMAL
SITE SITE: ABNORMAL
SOURCE SOURCE: ABNORMAL

## 2018-07-24 NOTE — TELEPHONE ENCOUNTER
I confirmed patient does not have any appointments with [facility].   Per Jude patient was to call [facility] and schedule with  for tomorrow.    I informed patient to call and speak with Gabriella - 's MA to schedule.  Phone number provider.

## 2018-07-24 NOTE — TELEPHONE ENCOUNTER
VOICEMAIL  1. Caller Name: Boni                       Call Back Number: [phone] - [facility]     2. Message: Patient LVM stating he has tried to reach [facility] to confirm appointment tomorrow however can not reach them and is requesting that we contact them.     3. Patient approves office to leave a detailed voicemail/MyChart message: yes

## 2018-08-08 LAB
FUNGUS SPEC CULT: NORMAL
SIGNIFICANT IND 70042: NORMAL
SITE SITE: NORMAL
SOURCE SOURCE: NORMAL

## 2018-09-04 LAB
MYCOBACTERIUM SPEC CULT: NORMAL
RHODAMINE-AURAMINE STN SPEC: NORMAL
SIGNIFICANT IND 70042: NORMAL
SITE SITE: NORMAL
SOURCE SOURCE: NORMAL

## 2019-04-14 ENCOUNTER — HOSPITAL ENCOUNTER (EMERGENCY)
Dept: HOSPITAL 94 - ER | Age: 64
Discharge: HOME | End: 2019-04-14
Payer: COMMERCIAL

## 2019-04-14 VITALS — HEIGHT: 71 IN | WEIGHT: 242.51 LBS | BODY MASS INDEX: 33.95 KG/M2

## 2019-04-14 VITALS — SYSTOLIC BLOOD PRESSURE: 104 MMHG | DIASTOLIC BLOOD PRESSURE: 71 MMHG

## 2019-04-14 DIAGNOSIS — Z88.2: ICD-10-CM

## 2019-04-14 DIAGNOSIS — L02.511: ICD-10-CM

## 2019-04-14 DIAGNOSIS — L02.512: Primary | ICD-10-CM

## 2019-04-14 DIAGNOSIS — Z79.899: ICD-10-CM

## 2019-04-14 PROCEDURE — 26010 DRAINAGE OF FINGER ABSCESS: CPT

## 2019-04-14 PROCEDURE — 87186 SC STD MICRODIL/AGAR DIL: CPT

## 2019-04-14 PROCEDURE — 99284 EMERGENCY DEPT VISIT MOD MDM: CPT

## 2019-04-14 PROCEDURE — 90715 TDAP VACCINE 7 YRS/> IM: CPT

## 2019-04-14 PROCEDURE — 87070 CULTURE OTHR SPECIMN AEROBIC: CPT

## 2019-04-14 PROCEDURE — 10061 I&D ABSCESS COMP/MULTIPLE: CPT

## 2019-04-14 PROCEDURE — 90471 IMMUNIZATION ADMIN: CPT

## 2019-04-14 PROCEDURE — 10060 I&D ABSCESS SIMPLE/SINGLE: CPT

## 2019-04-14 PROCEDURE — 87077 CULTURE AEROBIC IDENTIFY: CPT

## 2019-06-12 NOTE — PROGRESS NOTES
[facility] Hospitalist Progress Note    Date of Service: 7/16/2018    Chief Complaint  62 y.o. male admitted 7/9/2018 with signs of knee infection.  Patient received surgery I&D.  Infectious disease specialist was consulted.    Interval Problem Update  7/10 patient has been stable overnight, sleepy during the day.  has wound VAC in place. Ortho on. Patient's pain is local, 6-8/10, intermittent and does not radiate to other location, sharp and with some tingling. Can be controlled by pain meds. Dressing in place. Patient otherwise denies fever, chills, nausea, vomiting, adb pain, SOB, CP, headache, constipation, diarrhea, cough, or sputum.  7/11 patient has been stable overnight. Patient's pain is local, 6-8/10, intermittent and does not radiate to other location, sharp and with some tingling. Can be controlled by pain meds. Dressing in place.  Patient otherwise denies fever, chills, nausea, vomiting, adb pain, SOB, CP, headache, constipation, diarrhea, cough, or sputum.  7/12 I/D again today. Patient otherwise denies fever, chills, nausea, vomiting, adb pain, SOB, CP, headache, constipation, diarrhea, cough, or sputum.  7/13 patient had been stable overnight, tolerated procedure yesterday, patient complained of knee tenderness. Patient's pain is local, 6-8/10, intermittent and does not radiate to other location, sharp and with some tingling. Can be controlled by pain meds. Dressing in place.  Patient wants to go home.  Infectious disease specialist recommend total of 4 weeks of at least antibiotics.Patient otherwise denies fever, chills, nausea, vomiting, adb pain, SOB, CP, headache, constipation, diarrhea, cough, or sputum.  7/14 patient will need PICC line in place, however initially patient refused PICC line.  After prolonged discussion with patient and patient agreed with PICC line insertion.  Patient also complains of not able to shave and wants to have eyeglasses. Patient otherwise denies fever, chills, nausea,  vomiting, adb pain, SOB, CP, headache, constipation, diarrhea, cough, or sputum.  7/15 patient is pleasant today.  Patient is agreeable with PICC line placement.  Patient's wound VAC stopped working last night however surgery indicate patient may be able to get out of wound VAC today.  Patient's pain better controlled today.  Patient denies fever, chills, nausea vomiting diarrhea or shortness of breath.  7/16 patient's wound is okay, wound VAC discontinued by orthopedics, continue to follow by LPS and wound care team.  Patient is to continue IV antibiotics because of the concern of prosthetic knee.  Infectious disease specialist continue to follow.  Patient wants to go home but at this moment we need to help patient set up outpatient wound care and antibiotics. Patient otherwise denies fever, chills, nausea, vomiting, adb pain, SOB, CP, headache, constipation, diarrhea, cough, or sputum.      Consultants/Specialty  Ortho  Id     Disposition  tbd        Review of Systems   Constitutional: Negative for chills, fever, malaise/fatigue and weight loss.   HENT: Negative for congestion, ear discharge, ear pain, hearing loss and nosebleeds.    Eyes: Negative for blurred vision, double vision, photophobia, pain and redness.   Respiratory: Negative for cough, hemoptysis, sputum production, shortness of breath and wheezing.    Cardiovascular: Negative for chest pain, palpitations, claudication, leg swelling and PND.   Gastrointestinal: Negative for abdominal pain, constipation, diarrhea, heartburn, nausea and vomiting.   Genitourinary: Negative for dysuria, flank pain, frequency, hematuria and urgency.   Musculoskeletal: Positive for joint pain. Negative for back pain, falls and neck pain.   Skin: Negative for rash.   Neurological: Negative for dizziness, tingling, tremors, sensory change, speech change, focal weakness, seizures, weakness and headaches.   Psychiatric/Behavioral: Negative for depression, substance abuse and  suicidal ideas.      Physical Exam  Laboratory/Imaging   Hemodynamics  Temp (24hrs), Av.9 °C (98.4 °F), Min:36.6 °C (97.8 °F), Max:37.3 °C (99.2 °F)   Temperature: 36.6 °C (97.8 °F)  Pulse  Av.1  Min: 59  Max: 98    Blood Pressure: 116/57      Respiratory      Respiration: 18, Pulse Oximetry: 91 %        RUL Breath Sounds: Clear, RML Breath Sounds: Clear, RLL Breath Sounds: Clear, FIDELINA Breath Sounds: Clear, LLL Breath Sounds: Clear    Fluids    Intake/Output Summary (Last 24 hours) at 18 0828  Last data filed at 18 0432   Gross per 24 hour   Intake              680 ml   Output             1300 ml   Net             -620 ml       Nutrition  Orders Placed This Encounter   Procedures   • Diet Order Diabetic     Standing Status:   Standing     Number of Occurrences:   1     Order Specific Question:   Diet:     Answer:   Diabetic [3]     Physical Exam   Constitutional: He is oriented to person, place, and time. He appears well-developed and well-nourished. No distress.   HENT:   Head: Normocephalic.   Eyes: Conjunctivae and EOM are normal. Pupils are equal, round, and reactive to light. Left eye exhibits no discharge.   Neck: Normal range of motion. Neck supple. No JVD present. No thyromegaly present.   Cardiovascular: Normal rate, regular rhythm and normal heart sounds.  Exam reveals no gallop and no friction rub.    No murmur heard.  Pulmonary/Chest: Effort normal and breath sounds normal. No respiratory distress. He has no wheezes. He has no rales. He exhibits no tenderness.   Abdominal: Soft. Bowel sounds are normal. He exhibits no distension and no mass. There is no tenderness. There is no rebound and no guarding.   Musculoskeletal: Normal range of motion. He exhibits no edema.   Right knee tender to touch, warm to touch, possible infection, status post surgery.   Lymphadenopathy:     He has no cervical adenopathy.   Neurological: He is alert and oriented to person, place, and time. He displays  normal reflexes. No cranial nerve deficit.   Skin: Skin is dry. No rash noted. He is not diaphoretic.   Psychiatric: He has a normal mood and affect.   Nursing note and vitals reviewed.                               Assessment/Plan          * Prepatellar bursitis of right knee- (present on admission)   Assessment & Plan    s/p I/D 7/9 and 7/12  Ortho on  Culture pending  ID on, orderPICC line.  Patient initially declined however after prolonged discussion patient agreed currently.  Close monitor  Cont iv abx cefazolin total of 4 weeks antibiotics  Patient has infected bursitis  PICC line in place  Patient need to have IV antibiotics, likely be able to use ertapenem however patient wants to go home at this moment we will try to convince patient to accept IV antibiotics at home.  Patient also need wound care as outpatient.        Hyponatremia- (present on admission)   Assessment & Plan    Possible from hyperglycemia  Also concern of dehydration  IV fluid rehydration finished and the patient's sodium level improved significantly.        Type 2 diabetes mellitus, without long-term current use of insulin ([facility])- (present on admission)   Assessment & Plan    a1c 11.93  DM diet  Lantus 15iu with ISS to cover  Monitor BS  Patient has type 2 diabetes without long-term use of insulin without complication  Patient's blood sugar not controlled with hyperglycemia, however currently better controlled.            Hyperglycemia- (present on admission)   Assessment & Plan    Glucose was 223 in the ER at [place]  Sliding scale insulin ordered  HbA1c 11.9            Quality-Core Measures   Reviewed items::  Labs reviewed, Medications reviewed and Radiology images reviewed  Vivas catheter::  No Vivas  DVT prophylaxis pharmacological::  Enoxaparin (Lovenox)  DVT prophylaxis - mechanical:  SCDs  Antibiotics:  Treating active infection/contamination beyond 24 hours perioperative coverage   For complexity-based billing, please refer to  the history, exam, and decison making above. In addition, I caring for the patient today. More than 50% of the time was spent counseling and coordinating care.    I have discussed with RN and HOMERO and SW and other consultants about patient's plan.         no

## 2022-12-20 NOTE — CARE PLAN
Goal Outcome Evaluation:           Problem: Adult Inpatient Plan of Care  Goal: Plan of Care Review  Outcome: Ongoing, Progressing  Goal: Patient-Specific Goal (Individualized)  Outcome: Ongoing, Progressing  Goal: Absence of Hospital-Acquired Illness or Injury  Outcome: Ongoing, Progressing  Intervention: Identify and Manage Fall Risk  Recent Flowsheet Documentation  Taken 12/19/2022 1405 by Terese Diaz RN  Safety Promotion/Fall Prevention: safety round/check completed  Intervention: Prevent Skin Injury  Recent Flowsheet Documentation  Taken 12/19/2022 1405 by Terese Diaz RN  Body Position: position changed independently  Intervention: Prevent and Manage VTE (Venous Thromboembolism) Risk  Recent Flowsheet Documentation  Taken 12/19/2022 1412 by Terese Diaz RN  VTE Prevention/Management:   bilateral   sequential compression devices on  Taken 12/19/2022 1405 by Terese Diaz RN  Activity Management: activity adjusted per tolerance  VTE Prevention/Management:   bilateral   sequential compression devices on  Goal: Optimal Comfort and Wellbeing  Outcome: Ongoing, Progressing  Goal: Readiness for Transition of Care  Outcome: Ongoing, Progressing  Intervention: Mutually Develop Transition Plan  Recent Flowsheet Documentation  Taken 12/19/2022 1804 by Terese Diaz RN  Transportation Anticipated: family or friend will provide  Patient/Family Anticipated Services at Transition: none  Patient/Family Anticipates Transition to: home with family  Taken 12/19/2022 1803 by Terese Diaz RN  Equipment Currently Used at Home:   bp cuff   pulse ox     Problem: Fall Injury Risk  Goal: Absence of Fall and Fall-Related Injury  Outcome: Ongoing, Progressing  Intervention: Promote Injury-Free Environment  Recent Flowsheet Documentation  Taken 12/19/2022 1405 by Terese Diaz RN  Safety Promotion/Fall Prevention: safety round/check completed     Problem: Pain Chronic (Persistent) (Comorbidity Management)  Goal: Acceptable Pain Control and  Problem: Safety  Goal: Will remain free from falls  Pt is s/p I&D of right knee with wound vac placement. Safety precautions in place. Bed in low position, treaded socks on, personal possessions in reach, call light in reach and pt using it to call for assistance. Bed alarm on.    Problem: Infection  Goal: Will remain free from infection  Pt afebrile on IV abx UNASYN and VANCO.       Functional Ability  Outcome: Ongoing, Progressing     Problem: Bleeding (Spinal Surgery)  Goal: Absence of Bleeding  Outcome: Ongoing, Progressing     Problem: Bowel Motility Impaired (Spinal Surgery)  Goal: Effective Bowel Elimination  Outcome: Ongoing, Progressing     Problem: Fluid and Electrolyte Imbalance (Spinal Surgery)  Goal: Fluid and Electrolyte Balance  Outcome: Ongoing, Progressing     Problem: Functional Ability Impaired (Spinal Surgery)  Goal: Optimal Functional Ability  Outcome: Ongoing, Progressing  Intervention: Optimize Functional Status  Recent Flowsheet Documentation  Taken 12/19/2022 1405 by Terese Diaz RN  Activity Management: activity adjusted per tolerance  Positioning/Transfer Devices:   pillows   in use     Problem: Infection (Spinal Surgery)  Goal: Absence of Infection Signs and Symptoms  Outcome: Ongoing, Progressing     Problem: Neurologic Impairment (Spinal Surgery)  Goal: Optimal Neurologic Function  Outcome: Ongoing, Progressing  Intervention: Optimize Neurologic Function  Recent Flowsheet Documentation  Taken 12/19/2022 1405 by Terese Diaz RN  Body Position: position changed independently     Problem: Ongoing Anesthesia Effects (Spinal Surgery)  Goal: Anesthesia/Sedation Recovery  Outcome: Ongoing, Progressing  Intervention: Optimize Anesthesia Recovery  Recent Flowsheet Documentation  Taken 12/19/2022 1405 by Terese Diaz RN  Safety Promotion/Fall Prevention: safety round/check completed     Problem: Pain (Spinal Surgery)  Goal: Acceptable Pain Control  Outcome: Ongoing, Progressing     Problem: Postoperative Nausea and Vomiting (Spinal Surgery)  Goal: Nausea and Vomiting Relief  Outcome: Ongoing, Progressing     Problem: Postoperative Urinary Retention (Spinal Surgery)  Goal: Effective Urinary Elimination  Outcome: Ongoing, Progressing     Problem: Respiratory Compromise (Spinal Surgery)  Goal: Effective Oxygenation and Ventilation  Outcome: Ongoing, Progressing  Intervention:  Optimize Oxygenation and Ventilation  Recent Flowsheet Documentation  Taken 12/19/2022 1405 by Terese Diaz, RN  Head of Bed (HOB) Positioning: HOB elevated

## (undated) DEVICE — STAPLER SKIN DISP - (6/BX 10BX/CA) VISISTAT

## (undated) DEVICE — KIT ROOM DECONTAMINATION

## (undated) DEVICE — TOWELS CLOTH SURGICAL - (4/PK 20PK/CA)

## (undated) DEVICE — PROTECTOR ULNA NERVE - (36PR/CA)

## (undated) DEVICE — BANDAGE ELASTIC 6 HONEYCOMB - 6X5YD LF (20/CA)"

## (undated) DEVICE — ELECTRODE DUAL RETURN W/ CORD - (50/PK)

## (undated) DEVICE — SUTURE 0 COATED VICRYL PLUS - CTX CR(12/BX)18 IN

## (undated) DEVICE — GLOVE SZ 6.5 BIOGEL PI MICRO - PF LF (50PR/BX)

## (undated) DEVICE — CANISTER SUCTION 3000ML MECHANICAL FILTER AUTO SHUTOFF MEDI-VAC NONSTERILE LF DISP  (40EA/CA)

## (undated) DEVICE — TUBING CLEARLINK DUO-VENT - C-FLO (48EA/CA)

## (undated) DEVICE — SENSOR SPO2 NEO LNCS ADHESIVE (20/BX) SEE USER NOTES

## (undated) DEVICE — GLOVE BIOGEL ECLIPSE PF LATEX SIZE 9.0

## (undated) DEVICE — CONTAINER SPECIMEN BAG OR - STERILE 4 OZ W/LID (100EA/CA)

## (undated) DEVICE — GOWN SURGEONS X-LARGE - DISP. (30/CA)

## (undated) DEVICE — GLOVE BIOGEL ECLIPSE  PF LATEX SIZE 6.5 (50PR/BX)

## (undated) DEVICE — PACK LOWER EXTREMITY - (2/CA)

## (undated) DEVICE — SODIUM CHL IRRIGATION 0.9% 1000ML (12EA/CA)

## (undated) DEVICE — SUTURE 2-0 ETHILON FS - (36/BX) 18 INCH

## (undated) DEVICE — DRESSING KIT V.A.C. SENSA T.R.A.C. SMALL (10EA/CA)

## (undated) DEVICE — GLOVE BIOGEL INDICATOR SZ 6.5 SURGICAL PF LTX - (50PR/BX 4BX/CA)

## (undated) DEVICE — SYRINGE 10 ML CONTROL LL (25EA/BX 4BX/CA)

## (undated) DEVICE — CATHETER IV 18 GA X 1-1/4 - SAFETY BRAUN (50/BX)

## (undated) DEVICE — TRAY SKIN SCRUB PVP WET (20EA/CA) PART #DYND70356 DISCONTINUED

## (undated) DEVICE — SLEEVE, VASO, THIGH, MED

## (undated) DEVICE — SUTURE ETHILON 2-0 FSLX 30 (36PK/BX)"

## (undated) DEVICE — VAC CANISTER W/GEL 500ML - FITS NEW MACHINES (10EA/CA)

## (undated) DEVICE — KIT ANESTHESIA W/CIRCUIT & 3/LT BAG W/FILTER (20EA/CA)

## (undated) DEVICE — GOWN WARMING STANDARD FLEX - (30/CA)

## (undated) DEVICE — SET EXTENSION WITH 2 PORTS (48EA/CA) ***PART #2C8610 IS A SUBSTITUTE*****

## (undated) DEVICE — HEAD HOLDER JUNIOR/ADULT

## (undated) DEVICE — BOVIE BLADE COATED - (50/PK)

## (undated) DEVICE — GUARD SPLASH FOR PULSEVAC (12EA/PK)

## (undated) DEVICE — WRAP COBAN SELF-ADHERENT 6 IN X  5YDS STERILE TAN (12/CA)

## (undated) DEVICE — CHLORAPREP 26 ML APPLICATOR - ORANGE TINT(25/CA)

## (undated) DEVICE — KIT EVACUATER 3 SPRING PVC LF 1/8 DRAIN SIZE (10EA/CA)"

## (undated) DEVICE — DRAPE LARGE 3 QUARTER - (20/CA)

## (undated) DEVICE — HANDPIECE 10FT INTPLS SCT PLS IRRIGATION HAND CONTROL SET (6/PK)

## (undated) DEVICE — PADDING CAST 6 IN STERILE - 6 X 4 YDS (24/CA)

## (undated) DEVICE — TIP INTPLS HFLO ML ORFC BTRY - (12/CS)  FOR SURGILAV

## (undated) DEVICE — MASK ANESTHESIA ADULT  - (100/CA)

## (undated) DEVICE — GLOVE BIOGEL INDICATOR SZ 7.5 SURGICAL PF LTX - (50PR/BX 4BX/CA)

## (undated) DEVICE — GLOVE BIOGEL SZ 7.5 SURGICAL PF LTX - (50PR/BX 4BX/CA)

## (undated) DEVICE — SET LEADWIRE 5 LEAD BEDSIDE DISPOSABLE ECG (1SET OF 5/EA)

## (undated) DEVICE — GLOVE BIOGEL SZ 6.5 SURGICAL PF LTX (50PR/BX 4BX/CA)

## (undated) DEVICE — GLOVE BIOGEL ECLIPSE PF LATEX SIZE 8.5 (50PR/BX)

## (undated) DEVICE — LACTATED RINGERS INJ 1000 ML - (14EA/CA 60CA/PF)

## (undated) DEVICE — BLADE SURGICAL #15 - (50/BX 3BX/CA)

## (undated) DEVICE — DRAPE SURGICAL U 77X120 - (10/CA)

## (undated) DEVICE — GLOVE BIOGEL PI INDICATOR SZ 6.5 SURGICAL PF LF - (50/BX 4BX/CA)

## (undated) DEVICE — MASK, LARYNGEAL AIRWAY #5

## (undated) DEVICE — NEPTUNE 4 PORT MANIFOLD - (20/PK)

## (undated) DEVICE — DRAPE U ORTHOPEDIC - (10/BX)

## (undated) DEVICE — SUTURE GENERAL

## (undated) DEVICE — SUTURE 2-0 VICRYL PLUS CT-1 - 8 X 18 INCH(12/BX)

## (undated) DEVICE — GLOVE BIOGEL PI ORTHO SZ 8.5 PF LF (40/BX)

## (undated) DEVICE — DRESSING, WOUND VAC MED.

## (undated) DEVICE — SUCTION INSTRUMENT YANKAUER BULBOUS TIP W/O VENT (50EA/CA)

## (undated) DEVICE — SYRINGE SAFETY 10 ML 18 GA X 1 1/2 BLUNT LL (100/BX 4BX/CA)

## (undated) DEVICE — IMMOBILIZER KNEE 20 INCH - (1/EA)

## (undated) DEVICE — SUTURE 1 VICRYL PLUS CTX - 8 X 18 INCH (12/BX)

## (undated) DEVICE — NEEDLE NON SAFETY HYPO 22 GA X 1 1/2 IN (100/BX)

## (undated) DEVICE — DRESSING 3X8 ADAPTIC GAUZE - NON-ADHERING (36/PK 6PK/BX)

## (undated) DEVICE — ELECTRODE 850 FOAM ADHESIVE - HYDROGEL RADIOTRNSPRNT (50/PK)

## (undated) DEVICE — SWAB ANAEROBIC SPEC.COLLECTOR - (25/PK 4PK/CA 100EA/CA)

## (undated) DEVICE — TOURNIQUET, STERILE 34 (BLUE)

## (undated) DEVICE — PAD LAP STERILE 18 X 18 - (5/PK 40PK/CA)

## (undated) DEVICE — SODIUM CHL. IRRIGATION 0.9% 3000ML (4EA/CA 65CA/PF)